# Patient Record
Sex: FEMALE | HISPANIC OR LATINO | Employment: FULL TIME | URBAN - METROPOLITAN AREA
[De-identification: names, ages, dates, MRNs, and addresses within clinical notes are randomized per-mention and may not be internally consistent; named-entity substitution may affect disease eponyms.]

---

## 2022-08-11 ENCOUNTER — OFFICE VISIT (OUTPATIENT)
Dept: URGENT CARE | Facility: CLINIC | Age: 52
End: 2022-08-11
Payer: OTHER MISCELLANEOUS

## 2022-08-11 DIAGNOSIS — S00.03XA HEMATOMA OF SCALP, INITIAL ENCOUNTER: ICD-10-CM

## 2022-08-11 DIAGNOSIS — Z02.6 ENCOUNTER RELATED TO WORKER'S COMPENSATION CLAIM: ICD-10-CM

## 2022-08-11 DIAGNOSIS — S46.811A STRAIN OF RIGHT TRAPEZIUS MUSCLE, INITIAL ENCOUNTER: ICD-10-CM

## 2022-08-11 DIAGNOSIS — S99.921A INJURY OF RIGHT FOOT, INITIAL ENCOUNTER: ICD-10-CM

## 2022-08-11 DIAGNOSIS — R51.9 ACUTE NONINTRACTABLE HEADACHE, UNSPECIFIED HEADACHE TYPE: ICD-10-CM

## 2022-08-11 DIAGNOSIS — S92.354A CLOSED NONDISPLACED FRACTURE OF FIFTH METATARSAL BONE OF RIGHT FOOT, INITIAL ENCOUNTER: Primary | ICD-10-CM

## 2022-08-11 PROCEDURE — 73630 X-RAY EXAM OF FOOT: CPT | Mod: RT,S$GLB,, | Performed by: RADIOLOGY

## 2022-08-11 PROCEDURE — 99204 PR OFFICE/OUTPT VISIT, NEW, LEVL IV, 45-59 MIN: ICD-10-PCS | Mod: S$GLB,,, | Performed by: NURSE PRACTITIONER

## 2022-08-11 PROCEDURE — 73630 XR FOOT COMPLETE 3 VIEW RIGHT: ICD-10-PCS | Mod: RT,S$GLB,, | Performed by: RADIOLOGY

## 2022-08-11 PROCEDURE — 99204 OFFICE O/P NEW MOD 45 MIN: CPT | Mod: S$GLB,,, | Performed by: NURSE PRACTITIONER

## 2022-08-11 RX ORDER — HYDROCODONE BITARTRATE AND ACETAMINOPHEN 5; 325 MG/1; MG/1
1 TABLET ORAL EVERY 6 HOURS PRN
Qty: 20 TABLET | Refills: 0 | Status: SHIPPED | OUTPATIENT
Start: 2022-08-11

## 2022-08-11 RX ORDER — IBUPROFEN 800 MG/1
800 TABLET ORAL 3 TIMES DAILY
Qty: 30 TABLET | Refills: 0 | Status: SHIPPED | OUTPATIENT
Start: 2022-08-11 | End: 2022-08-25 | Stop reason: SDUPTHER

## 2022-08-11 NOTE — PROGRESS NOTES
Subjective:       Patient ID: Blanca Thomas is a 52 y.o. female.    Chief Complaint: No chief complaint on file.    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 She hit the right side of her head and injured her right foot. Pain level 10    In wheelchair, German speaking female with  with c/o right foot pain and swelling and right side of head pain. 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 She states right arm is also sore. She states right after incident she felt dizziness but that has resolved still with tenderness to right side of head where there is a bump pain 8/10. Right foot with swelling and pain 10/10.     Leg Pain   The incident occurred 1 to 3 hours ago. The incident occurred at work. The injury mechanism was a fall. The pain is present in the right foot. The quality of the pain is described as shooting. The pain is at a severity of 10/10. The pain is severe. The pain has been worsening since onset. Associated symptoms include an inability to bear weight and a loss of motion. It is unknown if a foreign body is present. The symptoms are aggravated by weight bearing and movement. She has tried ice for the symptoms. The treatment provided no relief.       Constitution: Negative.   HENT: Negative.    Cardiovascular: Negative.    Respiratory: Negative.    Gastrointestinal: Negative.    Endocrine: negative.   Genitourinary: Negative.  Negative for frequency and urgency.   Musculoskeletal: Positive for pain, joint pain and joint swelling.   Skin: Negative.    Allergic/Immunologic: Negative.    Neurological: Positive for dizziness and headaches.   Hematologic/Lymphatic: Negative.    Psychiatric/Behavioral: Negative.         Objective:      Physical Exam  Vitals and nursing note reviewed.   Constitutional:        General: She is not in acute distress.     Appearance: She is well-developed. She is not diaphoretic.   HENT:      Head: Normocephalic and atraumatic.        Right Ear: Hearing and external ear normal.      Left Ear: Hearing and external ear normal.      Nose: Nose normal. No nasal deformity.   Eyes:      General: Lids are normal. No scleral icterus.     Conjunctiva/sclera: Conjunctivae normal.   Neck:      Trachea: Trachea normal.   Cardiovascular:      Pulses: Normal pulses.   Pulmonary:      Effort: Pulmonary effort is normal. No respiratory distress.      Breath sounds: No stridor.   Musculoskeletal:      Cervical back: Normal range of motion. Spasms and tenderness present.        Back:       Right foot: Swelling and tenderness present.        Legs:    Skin:     General: Skin is warm and dry.      Capillary Refill: Capillary refill takes less than 2 seconds.   Neurological:      Mental Status: She is alert. She is not disoriented.      GCS: GCS eye subscore is 4. GCS verbal subscore is 5. GCS motor subscore is 6.      Cranial Nerves: Cranial nerves 2-12 are intact.      Sensory: Sensation is intact. No sensory deficit.      Motor: Motor function is intact.      Coordination: Coordination is intact.      Gait: Gait abnormal (unable to test due to injury to foot).      Deep Tendon Reflexes: Reflexes are normal and symmetric.      Reflex Scores:       Tricep reflexes are 2+ on the right side and 2+ on the left side.       Bicep reflexes are 2+ on the right side and 2+ on the left side.       Brachioradialis reflexes are 2+ on the right side and 2+ on the left side.       Patellar reflexes are 2+ on the right side and 2+ on the left side.       Achilles reflexes are 2+ on the right side and 2+ on the left side.  Psychiatric:         Attention and Perception: She is attentive.         Speech: Speech normal.         Behavior: Behavior normal.       X-Ray Foot Complete 3 view Right    Result Date:  8/11/2022  EXAMINATION: XR FOOT COMPLETE 3 VIEW RIGHT CLINICAL HISTORY: . Unspecified injury of right foot, initial encounter TECHNIQUE: AP, lateral, and oblique views of the right foot were performed. COMPARISON: None FINDINGS: Bones appear appropriately mineralized for age.  Slight hallux valgus configuration.  Lisfranc articulation is congruent.  There is acute appearing fracture through the base of the 5th metatarsal approximate 1.5 cm distal to the styloid process, with fracture planes extending to the articular surface at the 5th tarsal metatarsal joint consistent with pseudo-Velez type fracture.  No dislocation or destructive osseous process.  Mild degenerative change at the 1st MTP joint.  No subcutaneous emphysema or radiodense retained foreign body.     Fifth metatarsal base acute appearing fracture with intra-articular extension, as above. Electronically signed by: Darinel Hairston MD Date:    08/11/2022 Time:    12:49    Assessment:       1. Closed nondisplaced fracture of fifth metatarsal bone of right foot, initial encounter    2. Injury of right foot, initial encounter    3. Hematoma of scalp, initial encounter    4. Encounter related to worker's compensation claim    5. Acute nonintractable headache, unspecified headache type    6. Strain of right trapezius muscle, initial encounter        Plan:     rx database reviewed  1. Monitor symptoms of headache- head trauma precautions- does not meet criteria for ct scan   2. norco for severe pain- fall precautions- ibuprofen for mild to moderate pain (hold prior to scheduled dental procedure  3. Refer to orthopedics for foot injury  4. Follow up here for head injury and neck strain- ibuprofen , ice ibuprofen  5. Crutches/walking boot. Non weight bearing  Medications Ordered This Encounter   Medications    HYDROcodone-acetaminophen (NORCO) 5-325 mg per tablet     Sig: Take 1 tablet by mouth every 6 (six) hours as needed for Pain (Take off duty only.).      Dispense:  20 tablet     Refill:  0     Quantity prescribed more than 7 day supply? Yes, quantity medically necessary     Order Specific Question:   I have reviewed the Prescription Drug Monitoring Program (PDMP) database for this patient prior to prescribing the above opioid medication     Answer:   Yes    ibuprofen (ADVIL,MOTRIN) 800 MG tablet     Sig: Take 1 tablet (800 mg total) by mouth 3 (three) times daily. Take with meals.     Dispense:  30 tablet     Refill:  0     Patient Instructions: Attention not to aggravate affected area, Apply ice 24-48 hours then apply heat/warm soaks, Elevated affected area, Referral to specialist to be scheduled, once authorized, Use Crutches as directed, Use splint as directed   Restrictions: Disabled until next office visit  Follow up in about 1 week (around 8/18/2022).

## 2022-08-11 NOTE — LETTER
Urgent Care - [address] [address]  Phone: [phone]  Fax: [phone]  [facility]: [id number]    Pt Name: Blanca Thomas  Injury Date: 08/11/2022   Employee ID: [id number] Date of First Treatment: 08/11/2022   Company:[employer]      Appointment Time: 11:00 AM Arrived:1100am   Provider: DOMINGO Davidson Time Out:130pm     Office Treatment:   1. Closed nondisplaced fracture of fifth metatarsal bone of right foot, initial encounter    2. Injury of right foot, initial encounter    3. Hematoma of scalp, initial encounter    4. Encounter related to worker's compensation claim    5. Acute nonintractable headache, unspecified headache type    6. Strain of right trapezius muscle, initial encounter      Medications Ordered This Encounter   Medications    HYDROcodone-acetaminophen (NORCO) 5-325 mg per tablet    ibuprofen (ADVIL,MOTRIN) 800 MG tablet      Patient Instructions: Attention not to aggravate affected area, Apply ice 24-48 hours then apply heat/warm soaks, Elevated affected area, Referral to specialist to be scheduled, once authorized, Use Crutches as directed, Use splint as directed    Restrictions: Disabled until next office visit     Return Appointment: 8/18/2022 1000am

## 2022-08-18 ENCOUNTER — OFFICE VISIT (OUTPATIENT)
Dept: URGENT CARE | Facility: CLINIC | Age: 52
End: 2022-08-18
Payer: OTHER MISCELLANEOUS

## 2022-08-18 DIAGNOSIS — S92.354D CLOSED NONDISPLACED FRACTURE OF FIFTH METATARSAL BONE OF RIGHT FOOT WITH ROUTINE HEALING, SUBSEQUENT ENCOUNTER: ICD-10-CM

## 2022-08-18 DIAGNOSIS — S99.921D INJURY OF RIGHT FOOT, SUBSEQUENT ENCOUNTER: Primary | ICD-10-CM

## 2022-08-18 PROCEDURE — 99214 PR OFFICE/OUTPT VISIT, EST, LEVL IV, 30-39 MIN: ICD-10-PCS | Mod: S$GLB,,, | Performed by: NURSE PRACTITIONER

## 2022-08-18 PROCEDURE — 99214 OFFICE O/P EST MOD 30 MIN: CPT | Mod: S$GLB,,, | Performed by: NURSE PRACTITIONER

## 2022-08-18 NOTE — PROGRESS NOTES
Subjective:       Patient ID: Blanca Thomas is a 52 y.o. female.    Chief Complaint: Foot Injury (Date of Injury 8/11/22.  RIGHT FOOT)    Interpretor # [id number] used via [employer] ([employer] [place]) Pt is here for a follow up on a RIGHT FOOT injury from 8/11/22.  She is a  for [employer].  She reports a 8/10 pain today.  CT       Patient here for follow up visit right foot fracture.  used for visit. Patient states pain in foot is 8/10 when putting foot down or moving. She is non weight bearing. She is using crutches. She does not need pain medicine refill. She has some swelling and bruising.  She has podiatry appt on 9/7/2022        Foot Injury   The incident occurred more than 1 week ago. The incident occurred at work. The injury mechanism was a fall. The pain is present in the right foot. The quality of the pain is described as aching. The pain is at a severity of 8/10. The pain is severe. Associated symptoms include an inability to bear weight. She reports no foreign bodies present. The symptoms are aggravated by movement, palpation and weight bearing. She has tried ice, immobilization, elevation, NSAIDs, non-weight bearing, rest and heat for the symptoms. The treatment provided no relief.       Constitution: Negative.   HENT: Negative.    Cardiovascular: Negative.    Respiratory: Negative.    Gastrointestinal: Negative.    Endocrine: negative.   Genitourinary: Negative.  Negative for frequency and urgency.   Musculoskeletal: Negative.  Positive for pain.   Skin: Negative.    Allergic/Immunologic: Negative.    Neurological: Negative.    Hematologic/Lymphatic: Negative.    Psychiatric/Behavioral: Negative.         Objective:      Physical Exam  Vitals and nursing note reviewed.   Constitutional:       General: She is not in acute distress.     Appearance: She is well-developed. She is not diaphoretic.   HENT:      Head: Normocephalic and atraumatic.      Right Ear: Hearing and  external ear normal.      Left Ear: Hearing and external ear normal.      Nose: Nose normal. No nasal deformity.   Eyes:      General: Lids are normal. No scleral icterus.     Conjunctiva/sclera: Conjunctivae normal.   Neck:      Trachea: Trachea normal.   Cardiovascular:      Pulses: Normal pulses.   Pulmonary:      Effort: Pulmonary effort is normal. No respiratory distress.      Breath sounds: No stridor.   Musculoskeletal:      Cervical back: Normal range of motion.      Right foot: Swelling and tenderness present.        Legs:    Skin:     General: Skin is warm and dry.      Capillary Refill: Capillary refill takes less than 2 seconds.   Neurological:      Mental Status: She is alert. She is not disoriented.      GCS: GCS eye subscore is 4. GCS verbal subscore is 5. GCS motor subscore is 6.      Sensory: No sensory deficit.   Psychiatric:         Attention and Perception: She is attentive.         Speech: Speech normal.         Behavior: Behavior normal.         Assessment:       1. Injury of right foot, subsequent encounter    2. Closed nondisplaced fracture of fifth metatarsal bone of right foot with routine healing, subsequent encounter        Plan:     will have patient follow up next week then podiatry 9/7/12022.   Ace wrap applied.   Continue rice  Non weight bearing       Patient Instructions: Use splint as directed, Use Crutches as directed, Attention not to aggravate affected area, Daily home exercises/warm soaks, Apply ice 24-48 hours then apply heat/warm soaks, Elevated affected area   Restrictions: Disabled until next office visit  Follow up in about 1 week (around 8/25/2022).

## 2022-08-18 NOTE — LETTER
Urgent Care - [address] [address]  Phone: [phone]  Fax: [phone]  [facility]: [id number]    Pt Name: Blanca Thomas  Injury Date: 08/11/2022   Employee ID: [id number] Date of Treatment: 08/18/2022   Company: [employer]      Appointment Time: 12:45 PM Arrived:1237   Provider: DOMINGO Davidson Time Out:1346     Office Treatment:   1. Injury of right foot, subsequent encounter    2. Closed nondisplaced fracture of fifth metatarsal bone of right foot with routine healing, subsequent encounter          Patient Instructions: Use splint as directed, Use Crutches as directed, Attention not to aggravate affected area, Daily home exercises/warm soaks, Apply ice 24-48 hours then apply heat/warm soaks, Elevated affected area    Restrictions: Disabled until next office visit     Return Appointment: Visit date 8/25/2022 at 900am

## 2022-08-25 ENCOUNTER — OFFICE VISIT (OUTPATIENT)
Dept: URGENT CARE | Facility: CLINIC | Age: 52
End: 2022-08-25
Payer: OTHER MISCELLANEOUS

## 2022-08-25 DIAGNOSIS — Z02.6 ENCOUNTER RELATED TO WORKER'S COMPENSATION CLAIM: Primary | ICD-10-CM

## 2022-08-25 DIAGNOSIS — S99.921A INJURY OF RIGHT FOOT, INITIAL ENCOUNTER: ICD-10-CM

## 2022-08-25 DIAGNOSIS — S92.354A CLOSED NONDISPLACED FRACTURE OF FIFTH METATARSAL BONE OF RIGHT FOOT, INITIAL ENCOUNTER: ICD-10-CM

## 2022-08-25 DIAGNOSIS — S00.03XA HEMATOMA OF SCALP, INITIAL ENCOUNTER: ICD-10-CM

## 2022-08-25 DIAGNOSIS — S92.354D CLOSED NONDISPLACED FRACTURE OF FIFTH METATARSAL BONE OF RIGHT FOOT WITH ROUTINE HEALING, SUBSEQUENT ENCOUNTER: ICD-10-CM

## 2022-08-25 PROCEDURE — 99213 PR OFFICE/OUTPT VISIT, EST, LEVL III, 20-29 MIN: ICD-10-PCS | Mod: S$GLB,,, | Performed by: NURSE PRACTITIONER

## 2022-08-25 PROCEDURE — 99213 OFFICE O/P EST LOW 20 MIN: CPT | Mod: S$GLB,,, | Performed by: NURSE PRACTITIONER

## 2022-08-25 RX ORDER — IBUPROFEN 800 MG/1
800 TABLET ORAL 3 TIMES DAILY
Qty: 30 TABLET | Refills: 0 | Status: SHIPPED | OUTPATIENT
Start: 2022-08-25

## 2022-08-25 NOTE — LETTER
Urgent Care - [address] [address]  Phone: [phone]  Fax: [phone]  [facility]: [id number]    Pt Name: Blanca Thomas  Injury Date: 08/11/2022   Employee ID:  Date of Treatment: 08/25/2022   Company: [employer]      Appointment Time: 08:45 AM Arrived: 0905 AM   Provider: DOMINGO Davidson Time Out: 1005 AM     Office Treatment:   1. Encounter related to worker's compensation claim    2. Closed nondisplaced fracture of fifth metatarsal bone of right foot with routine healing, subsequent encounter    3. Injury of right foot, initial encounter          Patient Instructions: Attention not to aggravate affected area, Use splint as directed, Apply ice 24-48 hours then apply heat/warm soaks, Elevated affected area (passive rom and massage)      Restrictions: Discharged to Ortho/Neuro/Opthomologist/Surgeon, Disabled until next office visit     Return Appointment: NONE  DISCHARGED TO SPECIALIST    CT

## 2022-08-25 NOTE — PROGRESS NOTES
Subjective:       Patient ID: Blanca Thomas is a 52 y.o. female.    Chief Complaint: Foot Injury (RIGHT FOOT 8/11/22)    Patient prefers daughter to translate in stead of Martii.  Pt is here for a follow up on a RIGHT FOOT injury from 8/11/22.  She is a [place] for [employer].  She reports a 10/10 pain today. She reports increased swelling.  CT     Foot Injury   The incident occurred more than 1 week ago. The incident occurred at work. The pain is present in the left foot. The quality of the pain is described as aching and cramping. The pain is at a severity of 10/10. The pain is moderate. The pain has been constant since onset. Associated symptoms include an inability to bear weight. She reports no foreign bodies present. The symptoms are aggravated by weight bearing, movement and palpation. She has tried ice, non-weight bearing, rest and elevation for the symptoms. The treatment provided mild relief.       Constitution: Negative.   HENT: Negative.    Cardiovascular: Negative.    Respiratory: Negative.    Gastrointestinal: Negative.    Endocrine: negative.   Genitourinary: Negative.  Negative for frequency and urgency.   Musculoskeletal: Negative.  Positive for pain.   Skin: Negative.    Allergic/Immunologic: Negative.    Neurological: Negative.    Hematologic/Lymphatic: Negative.    Psychiatric/Behavioral: Negative.         Objective:      Physical Exam  Vitals and nursing note reviewed.   Constitutional:       General: She is not in acute distress.     Appearance: She is well-developed. She is not diaphoretic.   HENT:      Head: Normocephalic and atraumatic.      Right Ear: Hearing and external ear normal.      Left Ear: Hearing and external ear normal.      Nose: Nose normal. No nasal deformity.   Eyes:      General: Lids are normal. No scleral icterus.     Conjunctiva/sclera: Conjunctivae normal.   Neck:      Trachea: Trachea normal.   Cardiovascular:      Pulses: Normal pulses.   Pulmonary:       Effort: Pulmonary effort is normal. No respiratory distress.      Breath sounds: No stridor.   Musculoskeletal:      Cervical back: Normal range of motion.      Right foot: Decreased range of motion. Swelling and tenderness present.        Legs:    Skin:     General: Skin is warm and dry.      Capillary Refill: Capillary refill takes less than 2 seconds.   Neurological:      Mental Status: She is alert. She is not disoriented.      GCS: GCS eye subscore is 4. GCS verbal subscore is 5. GCS motor subscore is 6.      Sensory: No sensory deficit.   Psychiatric:         Attention and Perception: She is attentive.         Speech: Speech normal.         Behavior: Behavior normal.         Assessment:       1. Encounter related to worker's compensation claim    2. Closed nondisplaced fracture of fifth metatarsal bone of right foot with routine healing, subsequent encounter    3. Injury of right foot, initial encounter        Plan:     patient has appt with podiatry next week.   Will refill rx for ibuprofen  Passive rom and massage recommended. Heat to foot to increase blood flow       Patient Instructions: Attention not to aggravate affected area, Use splint as directed, Apply ice 24-48 hours then apply heat/warm soaks, Elevated affected area (passive rom and massage)   Restrictions: Discharged to Ortho/Neuro/Opthomologist/Surgeon, Disabled until next office visit  Follow up for with specialist.

## 2022-09-07 ENCOUNTER — OFFICE VISIT (OUTPATIENT)
Dept: PODIATRY | Facility: CLINIC | Age: 52
End: 2022-09-07

## 2022-09-07 VITALS
HEART RATE: 80 BPM | SYSTOLIC BLOOD PRESSURE: 130 MMHG | DIASTOLIC BLOOD PRESSURE: 80 MMHG | WEIGHT: 190.06 LBS | HEIGHT: 65 IN | BODY MASS INDEX: 31.67 KG/M2

## 2022-09-07 DIAGNOSIS — M79.89 PAIN AND SWELLING OF TOE OF RIGHT FOOT: ICD-10-CM

## 2022-09-07 DIAGNOSIS — S92.351D FRACTURE OF BASE OF FIFTH METATARSAL BONE WITH ROUTINE HEALING, RIGHT: Primary | ICD-10-CM

## 2022-09-07 DIAGNOSIS — M79.674 PAIN AND SWELLING OF TOE OF RIGHT FOOT: ICD-10-CM

## 2022-09-07 PROCEDURE — 99203 OFFICE O/P NEW LOW 30 MIN: CPT | Mod: S$PBB,,, | Performed by: PODIATRIST

## 2022-09-07 PROCEDURE — 99999 PR PBB SHADOW E&M-EST. PATIENT-LVL III: ICD-10-PCS | Mod: PBBFAC,,, | Performed by: PODIATRIST

## 2022-09-07 PROCEDURE — 99203 PR OFFICE/OUTPT VISIT, NEW, LEVL III, 30-44 MIN: ICD-10-PCS | Mod: S$PBB,,, | Performed by: PODIATRIST

## 2022-09-07 PROCEDURE — 99999 PR PBB SHADOW E&M-EST. PATIENT-LVL III: CPT | Mod: PBBFAC,,, | Performed by: PODIATRIST

## 2022-09-07 PROCEDURE — 99213 OFFICE O/P EST LOW 20 MIN: CPT | Mod: PBBFAC | Performed by: PODIATRIST

## 2022-09-07 NOTE — LETTER
September 7, 2022      [id number]  [address]  [facility] [address]  Phone: [phone]       Patient: Blanca Thomas   YOB: 1970  Date of Visit: 09/07/2022    To Whom It May Concern:    Evon Thomas  was at [facility] on 09/07/2022. The patient is to remain off work until 10/10/22 due to a foot injury. If you have any questions or concerns, or if I can be of further assistance, please do not hesitate to contact me.    Sincerely,    Jon Shaikh DPM

## 2022-09-07 NOTE — PROGRESS NOTES
"Subjective:      Patient ID: Blanca Thomas is a 52 y.o. female.    Chief Complaint: Foot Injury (Right foot fracture. Injury happened 8/11/22. Worker's Comp//XRAY done: 8/11/2022)     is a 52 y.o. female who presents to the podiatry clinic  with complaint of  right foot pain. Onset of the symptoms was about a month ago. 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 Current symptoms include:  pain and swelling R foot . Aggravating factors: any weight bearing. Symptoms have progressed to a point and plateaued. Patient has had no prior foot problems. Evaluation to date: plain films: abnormal fracture base of R 5th metatrsal, non displaced, non angulated/nondeviated . Treatment to date:  went to ER where they obtained xrays, provided a short CAM boot and crutches. States she has been off the foot for the past 3-4 weeks . Patients rates pain 6/10 on pain scale.    Vitals:    09/07/22 1001   BP: 130/80   Pulse: 80   Weight: 86.2 kg (190 lb 0.6 oz)   Height: 5' 5" (1.651 m)     Review of Systems   Constitutional: Negative for chills and fever.   Cardiovascular:  Negative for chest pain, claudication and leg swelling.   Respiratory:  Negative for cough and shortness of breath.    Skin:  Negative for dry skin and nail changes.   Musculoskeletal:         R foot swelling and pain   Gastrointestinal:  Negative for nausea and vomiting.   Neurological:  Negative for numbness and paresthesias.   Psychiatric/Behavioral:  Negative for altered mental status.          Objective:      Physical Exam  Vitals reviewed.   Constitutional:       Appearance: She is well-developed.   HENT:      Head: Normocephalic.   Cardiovascular:      Pulses:           Dorsalis pedis pulses are 2+ on the right side and 2+ on the left side.        Posterior tibial pulses are 2+ on the right side and 2+ on the left side.      Comments: CRT < 3 sec to tips of toes. "   Pulmonary:      Effort: No respiratory distress.   Musculoskeletal:      Right lower leg: Edema (foot) present.      Left lower leg: No edema.      Comments: Pain with palpation of metatarsals 4/5 R foot, mostly 5th metatarsal base. Pain on ROM of 5th ray R foot. No palpable bony hypertrophy along R lateral foot. Otherwise rectus foot and toe position bilateral with no major deformities noted. Mild equinus noted b/l ankles with < 10 deg DF noted. MMT 5/5 in DF/PF/Inv/Ev resistance with no reproduction of pain in any direction. Passive range of motion of ankle and pedal joints is painless b/l.     Skin:     General: Skin is warm and dry.      Findings: No erythema.      Comments: No open lesions, lacerations or wounds noted. Nails are normal to R 1-5 and L 1-5. Interdigital spaces clean, dry and intact b/l. No erythema noted to b/l foot. Skin texture normal. Pedal hair normal. No ecchymosis or bruising noted to R foot. Skin temperature normal b/l foot.      Neurological:      Mental Status: She is alert and oriented to person, place, and time.      Sensory: No sensory deficit.      Comments: Light touch, proprioception, and sharp/dull sensation are all intact bilaterally.    Psychiatric:         Behavior: Behavior normal.         Thought Content: Thought content normal.         Judgment: Judgment normal.             Assessment:       Encounter Diagnoses   Name Primary?    Fracture of base of fifth metatarsal bone with routine healing, right Yes    Pain and swelling of toe of right foot          Plan:        was seen today for foot injury.    Diagnoses and all orders for this visit:    Fracture of base of fifth metatarsal bone with routine healing, right  -     X-Ray Foot Complete Right; Future  -     WALKER FOR HOME USE    Pain and swelling of toe of right foot  -     X-Ray Foot Complete Right; Future  -     WALKER FOR HOME USE      I counseled the patient on her conditions, their implications and medical  management.    Previous xray interpreted personally by myself. New Xray ordered right foot to assess for healing of fracture.     OTC NSAIDs as needed/directed for pain relief.     Continue CAM boot with complete NWB to R foot with crutches. Rolling knee walker ordered for better ambulation.     No further intervention at this time. Monthly xrays to assess healing. Briefly advised patient that if proper healing does not occur in the next 1 months, we may consider surgery to repair and fixate fracture. She verbalized understanding.     Work noted provided to remain off work for 1 month, until further notice.     RTC 1 month, sooner PRN

## 2022-09-30 ENCOUNTER — HOSPITAL ENCOUNTER (OUTPATIENT)
Dept: RADIOLOGY | Facility: HOSPITAL | Age: 52
Discharge: HOME | End: 2022-09-30
Attending: PODIATRIST

## 2022-09-30 DIAGNOSIS — M79.89 PAIN AND SWELLING OF TOE OF RIGHT FOOT: ICD-10-CM

## 2022-09-30 DIAGNOSIS — S92.351D FRACTURE OF BASE OF FIFTH METATARSAL BONE WITH ROUTINE HEALING, RIGHT: ICD-10-CM

## 2022-09-30 DIAGNOSIS — M79.674 PAIN AND SWELLING OF TOE OF RIGHT FOOT: ICD-10-CM

## 2022-09-30 PROCEDURE — 73630 XR FOOT COMPLETE 3 VIEW RIGHT: ICD-10-PCS | Mod: 26,RT,, | Performed by: RADIOLOGY

## 2022-09-30 PROCEDURE — 73630 X-RAY EXAM OF FOOT: CPT | Mod: TC,RT

## 2022-09-30 PROCEDURE — 73630 X-RAY EXAM OF FOOT: CPT | Mod: 26,RT,, | Performed by: RADIOLOGY

## 2022-10-03 ENCOUNTER — TELEPHONE (OUTPATIENT)
Dept: PODIATRY | Facility: CLINIC | Age: 52
End: 2022-10-03
Payer: COMMERCIAL

## 2022-10-03 NOTE — TELEPHONE ENCOUNTER
----- Message from Jon Shaikh DPM sent at 10/3/2022  1:06 PM CDT -----  Please call her and let her know that the xray looks stable. Some signs of healing present. Will still take another 1-2 months to fully heal. Continue with current treatment plan and follow up as scheduled. Thanks.

## 2022-10-07 ENCOUNTER — OFFICE VISIT (OUTPATIENT)
Dept: PODIATRY | Facility: CLINIC | Age: 52
End: 2022-10-07
Payer: OTHER MISCELLANEOUS

## 2022-10-07 VITALS
HEIGHT: 65 IN | DIASTOLIC BLOOD PRESSURE: 82 MMHG | WEIGHT: 190.06 LBS | SYSTOLIC BLOOD PRESSURE: 133 MMHG | BODY MASS INDEX: 31.67 KG/M2 | HEART RATE: 82 BPM

## 2022-10-07 DIAGNOSIS — M79.671 FOOT PAIN, RIGHT: ICD-10-CM

## 2022-10-07 DIAGNOSIS — S92.351D FRACTURE OF BASE OF FIFTH METATARSAL BONE WITH ROUTINE HEALING, RIGHT: Primary | ICD-10-CM

## 2022-10-07 PROCEDURE — 99999 PR PBB SHADOW E&M-EST. PATIENT-LVL III: CPT | Mod: PBBFAC,,, | Performed by: PODIATRIST

## 2022-10-07 PROCEDURE — 99213 OFFICE O/P EST LOW 20 MIN: CPT | Mod: S$GLB,,, | Performed by: PODIATRIST

## 2022-10-07 PROCEDURE — 99213 PR OFFICE/OUTPT VISIT, EST, LEVL III, 20-29 MIN: ICD-10-PCS | Mod: S$GLB,,, | Performed by: PODIATRIST

## 2022-10-07 PROCEDURE — 99999 PR PBB SHADOW E&M-EST. PATIENT-LVL III: ICD-10-PCS | Mod: PBBFAC,,, | Performed by: PODIATRIST

## 2022-10-07 RX ORDER — GLIPIZIDE 10 MG/1
TABLET ORAL
COMMUNITY
End: 2022-10-27 | Stop reason: SDUPTHER

## 2022-10-07 RX ORDER — ATORVASTATIN CALCIUM 20 MG/1
TABLET, FILM COATED ORAL
COMMUNITY
End: 2022-10-27 | Stop reason: SDUPTHER

## 2022-10-07 RX ORDER — ATORVASTATIN CALCIUM 20 MG/1
20 TABLET, FILM COATED ORAL DAILY
COMMUNITY
Start: 2022-08-03

## 2022-10-07 RX ORDER — METHOCARBAMOL 500 MG/1
TABLET, FILM COATED ORAL
COMMUNITY
Start: 2022-08-11

## 2022-10-07 RX ORDER — CICLOPIROX 80 MG/ML
SOLUTION TOPICAL
COMMUNITY
Start: 2022-06-18

## 2022-10-07 RX ORDER — CICLOPIROX 80 MG/ML
SOLUTION TOPICAL
COMMUNITY

## 2022-10-07 RX ORDER — METHOCARBAMOL 500 MG/1
TABLET, FILM COATED ORAL
COMMUNITY

## 2022-10-07 RX ORDER — GLIPIZIDE 10 MG/1
TABLET ORAL
COMMUNITY
Start: 2022-07-29

## 2022-10-07 RX ORDER — INSULIN DETEMIR 100 [IU]/ML
INJECTION, SOLUTION SUBCUTANEOUS
COMMUNITY
Start: 2022-06-29

## 2022-10-07 RX ORDER — PEN NEEDLE, DIABETIC 30 GX3/16"
NEEDLE, DISPOSABLE MISCELLANEOUS
COMMUNITY

## 2022-10-07 RX ORDER — EMPAGLIFLOZIN 25 MG/1
TABLET, FILM COATED ORAL
COMMUNITY
Start: 2022-07-29

## 2022-10-07 RX ORDER — DICLOFENAC SODIUM 50 MG/1
TABLET, DELAYED RELEASE ORAL
COMMUNITY

## 2022-10-07 RX ORDER — DICLOFENAC SODIUM 50 MG/1
TABLET, DELAYED RELEASE ORAL
COMMUNITY
Start: 2022-07-29

## 2022-10-07 RX ORDER — LANCETS 28 GAUGE
EACH MISCELLANEOUS
COMMUNITY
Start: 2022-05-04

## 2022-10-07 NOTE — PROGRESS NOTES
"Subjective:      Patient ID: Blanca Thomas is a 52 y.o. female.    Chief Complaint: Foot Injury (R foot fracture. XRAY done on 9/30/22)     is a 52 y.o. female who presents to the podiatry clinic  with complaint of  right foot pain. Onset of the symptoms was about a month ago. 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 Current symptoms include:  pain and swelling R foot . Aggravating factors: any weight bearing. Symptoms have progressed to a point and plateaued. Patient has had no prior foot problems. Evaluation to date: plain films: abnormal fracture base of R 5th metatrsal, non displaced, non angulated/nondeviated . Treatment to date:  went to ER where they obtained xrays, provided a short CAM boot and crutches. States she has been off the foot for the past 3-4 weeks . Patients rates pain 6/10 on pain scale.    10/07/2022: follow up for R foot 5th met base fracture. Has been keeping off foot with wheelchair and crutches. No pain. Had xray done which showed healing fracture of 5th met base.  No other complaints at this time.       Vitals:    10/07/22 1020   BP: 133/82   Pulse: 82   Weight: 86.2 kg (190 lb 0.6 oz)   Height: 5' 5" (1.651 m)   PainSc: 0-No pain     Review of Systems   Constitutional: Negative for chills and fever.   Cardiovascular:  Negative for chest pain, claudication and leg swelling.   Respiratory:  Negative for cough and shortness of breath.    Skin:  Negative for dry skin and nail changes.   Musculoskeletal:         R foot swelling and pain, improved   Gastrointestinal:  Negative for nausea and vomiting.   Neurological:  Negative for numbness and paresthesias.   Psychiatric/Behavioral:  Negative for altered mental status.          Objective:      Physical Exam  Vitals reviewed.   Constitutional:       Appearance: She is well-developed.   HENT:      Head: Normocephalic.   Cardiovascular:      Pulses:        "    Dorsalis pedis pulses are 2+ on the right side and 2+ on the left side.        Posterior tibial pulses are 2+ on the right side and 2+ on the left side.      Comments: CRT < 3 sec to tips of toes.   Pulmonary:      Effort: No respiratory distress.   Musculoskeletal:      Right lower leg: Edema (foot, improved) present.      Left lower leg: No edema.      Comments: Minimal to no pain with palpation and ROM of 5th metatarsal R foot. Pain on ROM of 5th ray R foot. No palpable bony hypertrophy along R lateral foot. Otherwise rectus foot and toe position bilateral with no major deformities noted. Mild equinus noted b/l ankles with < 10 deg DF noted. MMT 5/5 in DF/PF/Inv/Ev resistance with no reproduction of pain in any direction. Passive range of motion of ankle and pedal joints is painless b/l.     Skin:     General: Skin is warm and dry.      Findings: No erythema.      Comments: No open lesions, lacerations or wounds noted. Nails are normal to R 1-5 and L 1-5. Interdigital spaces clean, dry and intact b/l. No erythema noted to b/l foot. Skin texture normal. Pedal hair normal. No ecchymosis or bruising noted to R foot. Skin temperature normal b/l foot.      Neurological:      Mental Status: She is alert and oriented to person, place, and time.      Sensory: No sensory deficit.      Comments: Light touch, proprioception, and sharp/dull sensation are all intact bilaterally.    Psychiatric:         Behavior: Behavior normal.         Thought Content: Thought content normal.         Judgment: Judgment normal.             Assessment:       Encounter Diagnoses   Name Primary?    Fracture of base of fifth metatarsal bone with routine healing, right Yes    Foot pain, right          Plan:        was seen today for foot injury.    Diagnoses and all orders for this visit:    Fracture of base of fifth metatarsal bone with routine healing, right    Foot pain, right    I counseled the patient on her conditions, their  implications and medical management.    Xray reviewed noting healing fracture of 5th met base. Stable.     OTC NSAIDs as needed/directed for pain relief.     Continue CAM boot with transition to partial WB with crutches for 2-3 weeks followed by full WB in CAM boot with no crutches.     Work noted provided to remain off work for additional 1 month, until further notice.     Xray prior to next appointment.     RTC 3 weeks, sooner PRN

## 2022-10-07 NOTE — LETTER
October 7, 2022      [id number]  [address]  [facility] [address]  Phone: [phone]       Patient: Blanca Thomas   YOB: 1970  Date of Visit: 10/07/2022    To Whom It May Concern:    Evon Thomas  was at [facility] on 10/07/2022. At this time she is to remain off work until 11/7/22. If you have any questions or concerns, or if I can be of further assistance, please do not hesitate to contact me.    Sincerely,    Jon Shaikh DPM

## 2022-10-27 ENCOUNTER — TELEPHONE (OUTPATIENT)
Dept: PODIATRY | Facility: CLINIC | Age: 52
End: 2022-10-27
Payer: COMMERCIAL

## 2022-10-27 ENCOUNTER — OFFICE VISIT (OUTPATIENT)
Dept: PODIATRY | Facility: CLINIC | Age: 52
End: 2022-10-27

## 2022-10-27 ENCOUNTER — HOSPITAL ENCOUNTER (OUTPATIENT)
Dept: RADIOLOGY | Facility: HOSPITAL | Age: 52
Discharge: HOME | End: 2022-10-27
Attending: PODIATRIST

## 2022-10-27 VITALS
HEART RATE: 76 BPM | BODY MASS INDEX: 31.65 KG/M2 | DIASTOLIC BLOOD PRESSURE: 78 MMHG | HEIGHT: 65 IN | WEIGHT: 190 LBS | SYSTOLIC BLOOD PRESSURE: 127 MMHG | RESPIRATION RATE: 18 BRPM

## 2022-10-27 DIAGNOSIS — S92.351D FRACTURE OF BASE OF FIFTH METATARSAL BONE WITH ROUTINE HEALING, RIGHT: ICD-10-CM

## 2022-10-27 DIAGNOSIS — S92.351D FRACTURE OF BASE OF FIFTH METATARSAL BONE WITH ROUTINE HEALING, RIGHT: Primary | ICD-10-CM

## 2022-10-27 PROCEDURE — 99213 PR OFFICE/OUTPT VISIT, EST, LEVL III, 20-29 MIN: ICD-10-PCS | Mod: S$PBB,,, | Performed by: PODIATRIST

## 2022-10-27 PROCEDURE — 99213 OFFICE O/P EST LOW 20 MIN: CPT | Mod: S$PBB,,, | Performed by: PODIATRIST

## 2022-10-27 PROCEDURE — 73630 XR FOOT COMPLETE 3 VIEW RIGHT: ICD-10-PCS | Mod: 26,RT,, | Performed by: RADIOLOGY

## 2022-10-27 PROCEDURE — 99999 PR PBB SHADOW E&M-EST. PATIENT-LVL IV: ICD-10-PCS | Mod: PBBFAC,,, | Performed by: PODIATRIST

## 2022-10-27 PROCEDURE — 73630 X-RAY EXAM OF FOOT: CPT | Mod: TC,RT

## 2022-10-27 PROCEDURE — 99999 PR PBB SHADOW E&M-EST. PATIENT-LVL IV: CPT | Mod: PBBFAC,,, | Performed by: PODIATRIST

## 2022-10-27 PROCEDURE — 73630 X-RAY EXAM OF FOOT: CPT | Mod: 26,RT,, | Performed by: RADIOLOGY

## 2022-10-27 PROCEDURE — 99214 OFFICE O/P EST MOD 30 MIN: CPT | Mod: PBBFAC | Performed by: PODIATRIST

## 2022-10-27 NOTE — TELEPHONE ENCOUNTER
----- Message from Tanna Agudelo sent at 10/27/2022 10:15 AM CDT -----  Regarding: Orders  Contact: Daughter [phone]  Caller ( Daughter ) is requesting a Call back regarding  scheduling Xray orders in system   Caller states they are there now .Please Call to discuss further     Attempt to schedule   Not allowed

## 2022-10-27 NOTE — TELEPHONE ENCOUNTER
Laboratory Medicine and Pathology  Transfusion Medicine - Apheresis Procedure Note    Steph Agee MRN# [id number]   YOB: 1974 Age: 42 year old   Date of Procedure: 2/19/2017    Procedure:  Autologous PBSC Collection  Reason for Procedure:Hodgkin's Disease                      Assessment and Plan:   Steph Agee is a 42 year old female with history of Hodgkin's disease undergoing collection for autologous PBSC transplant. She is on our schedule for tomorrow     Attestation:   This patient has been seen and evaluated by me, Quirino Gaming.              History of Present Illness   Steph Agee is a 42 year old female with history of Hodgkin's disease referred to the Pulmonary BMT clinic by Dr. Chambers for evaluation of an abnormal chest CT.  Diagnosed with Hodgkin's disease in 11-14 and treated with 6 cycles of ABVD. Tolerated chemotherapy well without any difficulties from a pulmonary perspective.  Also underwent XRT to left shoulder, axilla and she thinks chest.  Had recurrence of disease in 5-16 in sternum and manubrium.  Underwent treatment with ICE x 4; no pulmonary complications.  Was being evaluated for autologous transplant when re-developed B symptoms.  Then treated with 3 cycles of Brituximab.  Had left sided thoracentesis for pleural effusion.  Developed flu like illness in January 2017 with cough and purulent sputum production, saw Oncologist, treated with Levofloxacin for 10 days with complete resolution of symptoms.  Recent PET-CT with RUL nodular infiltrate, per my reading this is improved compared to her study in January.  Episode of pneumonia 20 years ago.  Now undergoing collection for autologous PBSC transplant          Past Medical History:     Past Medical History   Diagnosis Date     Depression with anxiety      History of blood transfusion      Hodgkin disease (H) 2014, 2016     Neuropathy (H)      SVC syndrome 2014             Past Surgical History:     Past  Spoke with daughter,  they are currently at the Imaging center, Xray in.  Spoke with Luna at Imaging center who says they will get xray done.   "Surgical History   Procedure Laterality Date     Biopsy/excision lymph node(s), needle, superficial (cervical/inguinal/axillary) Left 2014     axillary     Biopsy of mass in the manubrium Left 2016     Insert port vascular access Right 2/14/2017     Procedure: INSERT PORT VASCULAR ACCESS;  Surgeon: Michael Sy PA-C;  Location:  OR              Social History:     Social History   Substance Use Topics     Smoking status: Former Smoker     Packs/day: 1.00     Types: Cigarettes     Start date: 1/1/1995     Quit date: 11/20/2015     Smokeless tobacco: Never Used     Alcohol use 0.0 oz/week     0 Standard drinks or equivalent per week      Comment: occasionally             Allergies:     Allergies   Allergen Reactions     Lisinopril Cough             Medications:     Current Outpatient Prescriptions   Medication Sig Dispense Refill     losartan (COZAAR) 25 MG tablet Take 0.5 tablets (12.5 mg) by mouth daily 45 tablet 0     venlafaxine (EFFEXOR-XR) 75 MG 24 hr capsule Take 1 capsule (75 mg) by mouth daily 30 capsule 3     gabapentin (NEURONTIN) 100 MG capsule Take 2 capsules (200 mg) by mouth 3 times daily 90 capsule 3     LORAZEPAM PO Take 0.25 mg by mouth every 4 hours as needed for anxiety              Abbreviated Physical Exam:   /60  Pulse 96  Temp 98.4  F (36.9  C) (Oral)  Resp 18  Ht 1.626 m (5' 4.02\")  Wt 81.2 kg (179 lb 0.2 oz)  BMI 30.71 kg/m2  Patient Alert & Oriented and in No Acute Distress         Laboratory Data:     BMP    Recent Labs  Lab 02/19/17  0810 02/18/17  0821 02/16/17  0819 02/15/17  0845    143 141 142   POTASSIUM 3.4 3.6 3.9 4.1   CHLORIDE 106 106 107 108   VALERIA 8.0* 7.8* 8.7 8.2*   CO2 32 28 27 25   BUN 10 14 15 16   CR 0.61 0.61 0.61 0.66   GLC 93 82 92 91     CBC    Recent Labs  Lab 02/19/17  0810 02/18/17  0821 02/17/17  0822 02/16/17  0819   WBC 38.6* 45.8* 45.6* 40.8*   RBC 2.83* 2.83* 3.13* 3.28*   HGB 9.5* 9.6* 10.5* 10.8*   HCT 28.4* 28.8* 31.5* 32.7*   MCV " 100 102* 101* 100   MCH 33.6* 33.9* 33.5* 32.9   MCHC 33.5 33.3 33.3 33.0   RDW 17.6* 17.6* 17.4* 17.2*   PLT 81* 106* 132* 156     INR    Recent Labs  Lab 02/14/17  1157   INR 0.94     Results for orders placed or performed during the hospital encounter of 02/19/17 (from the past 24 hour(s))   CBC with platelets differential   Result Value Ref Range    WBC 38.6 (H) 4.0 - 11.0 10e9/L    RBC Count 2.83 (L) 3.8 - 5.2 10e12/L    Hemoglobin 9.5 (L) 11.7 - 15.7 g/dL    Hematocrit 28.4 (L) 35.0 - 47.0 %     78 - 100 fl    MCH 33.6 (H) 26.5 - 33.0 pg    MCHC 33.5 31.5 - 36.5 g/dL    RDW 17.6 (H) 10.0 - 15.0 %    Platelet Count 81 (L) 150 - 450 10e9/L    Diff Method Manual Differential     % Neutrophils 91.3 %    % Lymphocytes 2.6 %    % Monocytes 1.7 %    % Eosinophils 2.6 %    % Basophils 0.9 %    % Metamyelocytes 0.9 %    Absolute Neutrophil 35.2 (H) 1.6 - 8.3 10e9/L    Absolute Lymphocytes 1.0 0.8 - 5.3 10e9/L    Absolute Monocytes 0.7 0.0 - 1.3 10e9/L    Absolute Eosinophils 1.0 (H) 0.0 - 0.7 10e9/L    Absolute Basophils 0.3 (H) 0.0 - 0.2 10e9/L    Absolute Metamyelocytes 0.3 (H) 0 10e9/L    Anisocytosis Slight     Macrocytes Present    Calcium ionized   Result Value Ref Range    Calcium Ionized 4.6 4.4 - 5.2 mg/dL   Magnesium   Result Value Ref Range    Magnesium 1.6 1.6 - 2.3 mg/dL   Basic metabolic panel   Result Value Ref Range    Sodium 144 133 - 144 mmol/L    Potassium 3.4 3.4 - 5.3 mmol/L    Chloride 106 94 - 109 mmol/L    Carbon Dioxide 32 20 - 32 mmol/L    Anion Gap 7 3 - 14 mmol/L    Glucose 93 70 - 99 mg/dL    Urea Nitrogen 10 7 - 30 mg/dL    Creatinine 0.61 0.52 - 1.04 mg/dL    GFR Estimate >90  Non  GFR Calc   >60 mL/min/1.7m2    GFR Estimate If Black >90   GFR Calc   >60 mL/min/1.7m2    Calcium 8.0 (L) 8.5 - 10.1 mg/dL            Procedure Summary:   An ~ 5 hour PBSC collection  is currently being  performed. The  R chest catheter  was used for access. ACD-A was used   for anticoagulation. To offset the effects of the citrate, calcium gluconate was given in the return line. The patient's vital signs are stable and  is tolerating the procedure well.      Attestation:   This patient has been seen and evaluated by me, Quirino Gaming.  Quirino Gaming   Division of Transfusion Medicine   [place] [address]   Pager: [phone] or [phone]

## 2022-10-27 NOTE — PROGRESS NOTES
"Subjective:      Patient ID: Blanca Thomas is a 52 y.o. female.    Chief Complaint: Follow-up (RT FOOT FRACTURE )     is a 52 y.o. female who presents to the podiatry clinic  with complaint of  right foot pain. Onset of the symptoms was about a month ago. 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 Current symptoms include:  pain and swelling R foot . Aggravating factors: any weight bearing. Symptoms have progressed to a point and plateaued. Patient has had no prior foot problems. Evaluation to date: plain films: abnormal fracture base of R 5th metatrsal, non displaced, non angulated/nondeviated . Treatment to date:  went to ER where they obtained xrays, provided a short CAM boot and crutches. States she has been off the foot for the past 3-4 weeks . Patients rates pain 6/10 on pain scale.    10/07/2022: follow up for R foot 5th met base fracture. Has been keeping off foot with wheelchair and crutches. No pain. Had xray done which showed healing fracture of 5th met base.  No other complaints at this time.     10/27/2022: follow up for R 5th met base fracture. Using CAM boot and crutches as directed and has 0/10 pain. Doing well. Here for further recommendations.       Vitals:    10/27/22 1336   BP: 127/78   Pulse: 76   Resp: 18   Weight: 86.2 kg (190 lb)   Height: 5' 5" (1.651 m)   PainSc: 0-No pain     Review of Systems   Constitutional: Negative for chills and fever.   Cardiovascular:  Negative for chest pain, claudication and leg swelling.   Respiratory:  Negative for cough and shortness of breath.    Skin:  Negative for dry skin and nail changes.   Musculoskeletal:         R foot pain resolved   Gastrointestinal:  Negative for nausea and vomiting.   Neurological:  Negative for numbness and paresthesias.   Psychiatric/Behavioral:  Negative for altered mental status.          Objective:      Physical Exam  Vitals reviewed. "   Constitutional:       Appearance: She is well-developed.   HENT:      Head: Normocephalic.   Cardiovascular:      Pulses:           Dorsalis pedis pulses are 2+ on the right side and 2+ on the left side.        Posterior tibial pulses are 2+ on the right side and 2+ on the left side.      Comments: CRT < 3 sec to tips of toes.   Pulmonary:      Effort: No respiratory distress.   Musculoskeletal:      Right lower leg: Edema (foot, improved) present.      Left lower leg: No edema.      Comments: No further pain with palpation and ROM of 5th metatarsal R foot. No pain on ROM of 5th ray R foot. No palpable bony hypertrophy along R lateral foot. Otherwise rectus foot and toe position bilateral with no major deformities noted. Mild equinus noted b/l ankles with < 10 deg DF noted. MMT 5/5 in DF/PF/Inv/Ev resistance with no reproduction of pain in any direction. Passive range of motion of ankle and pedal joints is painless b/l.     Skin:     General: Skin is warm and dry.      Findings: No erythema.      Comments: No open lesions, lacerations or wounds noted. Nails are normal to R 1-5 and L 1-5. Interdigital spaces clean, dry and intact b/l. No erythema noted to b/l foot. Skin texture normal. Pedal hair normal. No ecchymosis or bruising noted to R foot. Skin temperature normal b/l foot.      Neurological:      Mental Status: She is alert and oriented to person, place, and time.      Sensory: No sensory deficit.      Comments: Light touch, proprioception, and sharp/dull sensation are all intact bilaterally.    Psychiatric:         Behavior: Behavior normal.         Thought Content: Thought content normal.         Judgment: Judgment normal.             Assessment:       Encounter Diagnosis   Name Primary?    Fracture of base of fifth metatarsal bone with routine healing, right Yes           Plan:        was seen today for follow-up.    Diagnoses and all orders for this visit:    Fracture of base of fifth metatarsal  bone with routine healing, right      I counseled the patient on her conditions, their implications and medical management.    Xray reviewed noting continued (90%) healing fracture of 5th met base. Stable.     OTC NSAIDs as needed/directed for pain relief.     D/c CAM boot and crutches. Switch to Darco shoe. Use for next 1-2 weeks and switch to supportive athletic style shoe if no pain or problems.     Continue ACE as needed for swelling.     Work noted provided to remain off work for additional 3 weeks, until further notice.     RTC 3 weeks, sooner PRN

## 2022-10-27 NOTE — LETTER
October 27, 2022      [id number]  [address]  [facility] [address]  Phone: [phone]       Patient: Blanca Thomas   YOB: 1970  Date of Visit: 10/27/2022    To Whom It May Concern:    Evon Thomas  was at [facility] on 10/27/2022. The patient is to remain off work for additional 3 weeks. She may return to work after this with no restrictions. If you have any questions or concerns, or if I can be of further assistance, please do not hesitate to contact me.    Sincerely,    Jon Shaikh DPM

## 2022-11-23 ENCOUNTER — OFFICE VISIT (OUTPATIENT)
Dept: PODIATRY | Facility: CLINIC | Age: 52
End: 2022-11-23
Payer: OTHER MISCELLANEOUS

## 2022-11-23 ENCOUNTER — TELEPHONE (OUTPATIENT)
Dept: PODIATRY | Facility: CLINIC | Age: 52
End: 2022-11-23

## 2022-11-23 ENCOUNTER — HOSPITAL ENCOUNTER (OUTPATIENT)
Dept: RADIOLOGY | Facility: HOSPITAL | Age: 52
Discharge: HOME | End: 2022-11-23
Attending: PODIATRIST

## 2022-11-23 VITALS
SYSTOLIC BLOOD PRESSURE: 135 MMHG | BODY MASS INDEX: 31.67 KG/M2 | DIASTOLIC BLOOD PRESSURE: 79 MMHG | HEIGHT: 65 IN | HEART RATE: 87 BPM | WEIGHT: 190.06 LBS

## 2022-11-23 DIAGNOSIS — S92.351D FRACTURE OF BASE OF FIFTH METATARSAL BONE WITH ROUTINE HEALING, RIGHT: Primary | ICD-10-CM

## 2022-11-23 DIAGNOSIS — S92.351D FRACTURE OF BASE OF FIFTH METATARSAL BONE WITH ROUTINE HEALING, RIGHT: ICD-10-CM

## 2022-11-23 PROCEDURE — 99999 PR PBB SHADOW E&M-EST. PATIENT-LVL IV: ICD-10-PCS | Mod: PBBFAC,,, | Performed by: PODIATRIST

## 2022-11-23 PROCEDURE — 99999 PR PBB SHADOW E&M-EST. PATIENT-LVL IV: CPT | Mod: PBBFAC,,, | Performed by: PODIATRIST

## 2022-11-23 PROCEDURE — 73630 XR FOOT COMPLETE 3 VIEW RIGHT: ICD-10-PCS | Mod: 26,RT,, | Performed by: RADIOLOGY

## 2022-11-23 PROCEDURE — 73630 X-RAY EXAM OF FOOT: CPT | Mod: TC,RT

## 2022-11-23 PROCEDURE — 73630 X-RAY EXAM OF FOOT: CPT | Mod: 26,RT,, | Performed by: RADIOLOGY

## 2022-11-23 PROCEDURE — 99214 OFFICE O/P EST MOD 30 MIN: CPT | Mod: PBBFAC | Performed by: PODIATRIST

## 2022-11-23 PROCEDURE — 99213 PR OFFICE/OUTPT VISIT, EST, LEVL III, 20-29 MIN: ICD-10-PCS | Mod: S$GLB,,, | Performed by: PODIATRIST

## 2022-11-23 PROCEDURE — 99213 OFFICE O/P EST LOW 20 MIN: CPT | Mod: S$GLB,,, | Performed by: PODIATRIST

## 2022-11-23 NOTE — LETTER
November 23, 2022      [id number]  [address]  [facility] [address]  Phone: [phone]       Patient: Blanca Thomas   YOB: 1970  Date of Visit: 11/23/2022    To Whom It May Concern:    Evon Thomas  was at [facility] on 11/23/2022. The patient is to remain off work until follow up xray. Work restriction will likely be lifted if her xray appears improved. If you have any questions or concerns, or if I can be of further assistance, please do not hesitate to contact me.    Sincerely,    Jon Shaikh DPM

## 2022-11-23 NOTE — PROGRESS NOTES
"Subjective:      Patient ID: Blanca Thomas is a 52 y.o. female.    Chief Complaint: Foot Pain (Right foot(swollen)) and Diabetes Mellitus (PCP- 08/2022)     is a 52 y.o. female who presents to the podiatry clinic  with complaint of  right foot pain. Onset of the symptoms was about a month ago. 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 Current symptoms include:  pain and swelling R foot . Aggravating factors: any weight bearing. Symptoms have progressed to a point and plateaued. Patient has had no prior foot problems. Evaluation to date: plain films: abnormal fracture base of R 5th metatrsal, non displaced, non angulated/nondeviated . Treatment to date:  went to ER where they obtained xrays, provided a short CAM boot and crutches. States she has been off the foot for the past 3-4 weeks . Patients rates pain 6/10 on pain scale.    10/07/2022: follow up for R foot 5th met base fracture. Has been keeping off foot with wheelchair and crutches. No pain. Had xray done which showed healing fracture of 5th met base.  No other complaints at this time.     10/27/2022: follow up for R 5th met base fracture. Using CAM boot and crutches as directed and has 0/10 pain. Doing well. Here for further recommendations.     11/23/2022: follow up for R 5th met base fracture. Doing well. Using Darco shoe as directed. Hurts only on occasion but most of the time she is pain free. Still swelling a little. No new concerns.     Vitals:    11/23/22 1123   BP: 135/79   Pulse: 87   Weight: 86.2 kg (190 lb 0.6 oz)   Height: 5' 5" (1.651 m)   PainSc: 0-No pain     Review of Systems   Constitutional: Negative for chills and fever.   Cardiovascular:  Negative for chest pain, claudication and leg swelling.   Respiratory:  Negative for cough and shortness of breath.    Skin:  Negative for dry skin and nail changes.   Musculoskeletal:         R foot pain resolved "   Gastrointestinal:  Negative for nausea and vomiting.   Neurological:  Negative for numbness and paresthesias.   Psychiatric/Behavioral:  Negative for altered mental status.          Objective:      Physical Exam  Vitals reviewed.   Constitutional:       Appearance: She is well-developed.   HENT:      Head: Normocephalic.   Cardiovascular:      Pulses:           Dorsalis pedis pulses are 2+ on the right side and 2+ on the left side.        Posterior tibial pulses are 2+ on the right side and 2+ on the left side.      Comments: CRT < 3 sec to tips of toes.   Pulmonary:      Effort: No respiratory distress.   Musculoskeletal:      Right lower leg: Edema (foot, improved) present.      Left lower leg: No edema.      Comments: No further pain with palpation and ROM of 5th metatarsal R foot. No pain on ROM of 5th ray R foot. No palpable bony hypertrophy along R lateral foot. Otherwise rectus foot and toe position bilateral with no major deformities noted. Mild equinus noted b/l ankles with < 10 deg DF noted. MMT 5/5 in DF/PF/Inv/Ev resistance with no reproduction of pain in any direction. Passive range of motion of ankle and pedal joints is painless b/l.     Skin:     General: Skin is warm and dry.      Findings: No erythema.      Comments: No open lesions, lacerations or wounds noted. Nails are normal to R 1-5 and L 1-5. Interdigital spaces clean, dry and intact b/l. No erythema noted to b/l foot. Skin texture normal. Pedal hair normal. No ecchymosis or bruising noted to R foot. Skin temperature normal b/l foot.      Neurological:      Mental Status: She is alert and oriented to person, place, and time.      Sensory: No sensory deficit.      Comments: Light touch, proprioception, and sharp/dull sensation are all intact bilaterally.    Psychiatric:         Behavior: Behavior normal.         Thought Content: Thought content normal.         Judgment: Judgment normal.             Assessment:       Encounter Diagnosis   Name  Primary?    Fracture of base of fifth metatarsal bone with routine healing, right Yes             Plan:        was seen today for foot pain and diabetes mellitus.    Diagnoses and all orders for this visit:    Fracture of base of fifth metatarsal bone with routine healing, right  -     X-Ray Foot Complete Right; Future        I counseled the patient on her conditions, their implications and medical management.    Xray ordered to follow up right foot healing of 5th met base fracture.     OTC NSAIDs as needed/directed for pain relief.     D/c Darco shoe. Switch to supportive athletic style shoe if no pain or problems. Revert back to Darco shoe if pain returns.     Work noted provided to remain off work until xray results available.     RTC 4 weeks for final check, sooner PRN

## 2022-11-23 NOTE — TELEPHONE ENCOUNTER
----- Message from Jon Shaikh DPM sent at 11/23/2022  3:57 PM CST -----  Please call her and let her know that her xray still shows fracture however it is in great alignment and 50-70 healed. She can resume work but if her pain returns, she should discontinue but if she has no pain she can resume work. Follow up as scheduled. Thanks. She is Sinhala speaking.

## 2022-11-23 NOTE — TELEPHONE ENCOUNTER
Spoke with patient concerning her x-ray results               Per Dr. Jon Shaikh:  Please call her and let her know that her xray still shows fracture however it is in great alignment and 50-70 healed. She can resume work but if her pain returns, she should discontinue but if she has no pain she can resume work. Follow up as scheduled. Thanks. She is [place] speaking

## 2022-12-27 ENCOUNTER — OFFICE VISIT (OUTPATIENT)
Dept: PODIATRY | Facility: CLINIC | Age: 52
End: 2022-12-27

## 2022-12-27 VITALS
WEIGHT: 190 LBS | DIASTOLIC BLOOD PRESSURE: 84 MMHG | HEART RATE: 86 BPM | SYSTOLIC BLOOD PRESSURE: 137 MMHG | HEIGHT: 65 IN | BODY MASS INDEX: 31.65 KG/M2

## 2022-12-27 DIAGNOSIS — M77.51 TENDINITIS OF RIGHT ANKLE: ICD-10-CM

## 2022-12-27 DIAGNOSIS — S92.351D FRACTURE OF BASE OF FIFTH METATARSAL BONE WITH ROUTINE HEALING, RIGHT: Primary | ICD-10-CM

## 2022-12-27 PROCEDURE — 99999 PR PBB SHADOW E&M-EST. PATIENT-LVL III: ICD-10-PCS | Mod: PBBFAC,,, | Performed by: PODIATRIST

## 2022-12-27 PROCEDURE — 99213 PR OFFICE/OUTPT VISIT, EST, LEVL III, 20-29 MIN: ICD-10-PCS | Mod: S$PBB,,, | Performed by: PODIATRIST

## 2022-12-27 PROCEDURE — 99999 PR PBB SHADOW E&M-EST. PATIENT-LVL III: CPT | Mod: PBBFAC,,, | Performed by: PODIATRIST

## 2022-12-27 PROCEDURE — 99213 OFFICE O/P EST LOW 20 MIN: CPT | Mod: PBBFAC | Performed by: PODIATRIST

## 2022-12-27 PROCEDURE — 99213 OFFICE O/P EST LOW 20 MIN: CPT | Mod: S$PBB,,, | Performed by: PODIATRIST

## 2022-12-27 RX ORDER — DICLOFENAC SODIUM 10 MG/G
2 GEL TOPICAL 3 TIMES DAILY
Qty: 100 G | Refills: 3 | Status: SHIPPED | OUTPATIENT
Start: 2022-12-27

## 2022-12-27 NOTE — PROGRESS NOTES
"Subjective:      Patient ID: Blanca Thomas is a 52 y.o. female.    Chief Complaint: Follow-up (Right Foot FX)       is a 52 y.o. female who presents to the podiatry clinic  with complaint of  right foot pain. Onset of the symptoms was about a month ago. 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 Current symptoms include:  pain and swelling R foot . Aggravating factors: any weight bearing. Symptoms have progressed to a point and plateaued. Patient has had no prior foot problems. Evaluation to date: plain films: abnormal fracture base of R 5th metatrsal, non displaced, non angulated/nondeviated . Treatment to date:  went to ER where they obtained xrays, provided a short CAM boot and crutches. States she has been off the foot for the past 3-4 weeks . Patients rates pain 6/10 on pain scale.    10/07/2022: follow up for R foot 5th met base fracture. Has been keeping off foot with wheelchair and crutches. No pain. Had xray done which showed healing fracture of 5th met base.  No other complaints at this time.     10/27/2022: follow up for R 5th met base fracture. Using CAM boot and crutches as directed and has 0/10 pain. Doing well. Here for further recommendations.     11/23/2022: follow up for R 5th met base fracture. Doing well. Using Darco shoe as directed. Hurts only on occasion but most of the time she is pain free. Still swelling a little. No new concerns.     12/27/2022: follow up for right 5th met base fracture. Pain improved in area of fracture but having some pain at the level of ankle behind the fracture. Would like to get assessed for recommendations.       Vitals:    12/27/22 1405   BP: 137/84   Pulse: 86   Weight: 86.2 kg (190 lb)   Height: 5' 5" (1.651 m)   PainSc:   6   PainLoc: Foot     Review of Systems   Constitutional: Negative for chills and fever.   Cardiovascular:  Negative for chest pain, claudication and leg " swelling.   Respiratory:  Negative for cough and shortness of breath.    Skin:  Negative for dry skin and nail changes.   Musculoskeletal:         R foot pain recurrence    Gastrointestinal:  Negative for nausea and vomiting.   Neurological:  Negative for numbness and paresthesias.   Psychiatric/Behavioral:  Negative for altered mental status.          Objective:      Physical Exam  Vitals reviewed.   Constitutional:       Appearance: She is well-developed.   HENT:      Head: Normocephalic.   Cardiovascular:      Pulses:           Dorsalis pedis pulses are 2+ on the right side and 2+ on the left side.        Posterior tibial pulses are 2+ on the right side and 2+ on the left side.      Comments: CRT < 3 sec to tips of toes.   Pulmonary:      Effort: No respiratory distress.   Musculoskeletal:      Right lower leg: Edema (foot, improved) present.      Left lower leg: No edema.      Comments: No further pain with palpation and ROM of 5th metatarsal R foot. No pain on ROM of 5th ray R foot. No palpable bony hypertrophy along R lateral foot. Mild pain with palpation of peroneus tertius tendon along distal lateral R ankle and onto its insertion on the 5th metatarsal shaft. Otherwise rectus foot and toe position bilateral with no major deformities noted. Mild equinus noted b/l ankles with < 10 deg DF noted. MMT 5/5 in DF/PF/Inv/Ev resistance with no reproduction of pain in any direction. Passive range of motion of ankle and pedal joints is painless b/l.     Skin:     General: Skin is warm and dry.      Findings: No erythema.      Comments: No open lesions, lacerations or wounds noted. Nails are normal to R 1-5 and L 1-5. Interdigital spaces clean, dry and intact b/l. No erythema noted to b/l foot. Skin texture normal. Pedal hair normal. No ecchymosis or bruising noted to R foot. Skin temperature normal b/l foot.      Neurological:      Mental Status: She is alert and oriented to person, place, and time.      Sensory: No  sensory deficit.      Comments: Light touch, proprioception, and sharp/dull sensation are all intact bilaterally.    Psychiatric:         Behavior: Behavior normal.         Thought Content: Thought content normal.         Judgment: Judgment normal.             Assessment:       Encounter Diagnoses   Name Primary?    Fracture of base of fifth metatarsal bone with routine healing, right Yes    Tendinitis of right ankle                Plan:        was seen today for follow-up.    Diagnoses and all orders for this visit:    Fracture of base of fifth metatarsal bone with routine healing, right  -     X-Ray Foot Complete Right; Future    Tendinitis of right ankle  -     X-Ray Foot Complete Right; Future    Other orders  -     diclofenac sodium (VOLTAREN) 1 % Gel; Apply 2 g topically 3 (three) times daily. Apply to the area of pain 2-3x per day or night as needed         I counseled the patient on her conditions, their implications and medical management.    Xray ordered to follow up right foot healing of 5th met base fracture. Final xray likely.     Rx Voltaren gel to be applied to affected area up to 3-4 x daily as needed for pain. For R lateral ankle and foot pain.     Continue supportive shoes as directed.     Massage and ice area for pain relief as directed.     Continue work as scheduled.     RTC 4 weeks, sooner PRN for R peroneus tertius tendinitis check, consider PT.

## 2023-03-03 ENCOUNTER — TELEPHONE (OUTPATIENT)
Dept: PODIATRY | Facility: CLINIC | Age: 53
End: 2023-03-03

## 2023-03-03 ENCOUNTER — HOSPITAL ENCOUNTER (OUTPATIENT)
Dept: RADIOLOGY | Facility: HOSPITAL | Age: 53
Discharge: HOME | End: 2023-03-03
Attending: PODIATRIST

## 2023-03-03 ENCOUNTER — PATIENT MESSAGE (OUTPATIENT)
Dept: PODIATRY | Facility: CLINIC | Age: 53
End: 2023-03-03

## 2023-03-03 DIAGNOSIS — M77.51 TENDINITIS OF RIGHT ANKLE: ICD-10-CM

## 2023-03-03 DIAGNOSIS — S92.351D FRACTURE OF BASE OF FIFTH METATARSAL BONE WITH ROUTINE HEALING, RIGHT: ICD-10-CM

## 2023-03-03 PROCEDURE — 73630 X-RAY EXAM OF FOOT: CPT | Mod: 26,RT,, | Performed by: RADIOLOGY

## 2023-03-03 PROCEDURE — 73630 XR FOOT COMPLETE 3 VIEW RIGHT: ICD-10-PCS | Mod: 26,RT,, | Performed by: RADIOLOGY

## 2023-03-03 PROCEDURE — 73630 X-RAY EXAM OF FOOT: CPT | Mod: TC,RT

## 2023-03-03 NOTE — TELEPHONE ENCOUNTER
Patient rescheduled to 3/23 rescheduled from 3/3. Patient requesting a letter from missing work today. Letter in chart please print out.

## 2023-03-06 ENCOUNTER — PATIENT MESSAGE (OUTPATIENT)
Dept: PODIATRY | Facility: CLINIC | Age: 53
End: 2023-03-06

## 2023-03-23 ENCOUNTER — HOSPITAL ENCOUNTER (OUTPATIENT)
Dept: RADIOLOGY | Facility: HOSPITAL | Age: 53
Discharge: HOME | End: 2023-03-23
Attending: PODIATRIST
Payer: OTHER MISCELLANEOUS

## 2023-03-23 ENCOUNTER — OFFICE VISIT (OUTPATIENT)
Dept: PODIATRY | Facility: CLINIC | Age: 53
End: 2023-03-23

## 2023-03-23 VITALS
DIASTOLIC BLOOD PRESSURE: 76 MMHG | WEIGHT: 198.88 LBS | HEART RATE: 71 BPM | SYSTOLIC BLOOD PRESSURE: 144 MMHG | BODY MASS INDEX: 33.13 KG/M2 | HEIGHT: 65 IN

## 2023-03-23 DIAGNOSIS — M79.671 FOOT PAIN, RIGHT: ICD-10-CM

## 2023-03-23 DIAGNOSIS — S92.351G CLOSED FRACTURE OF BASE OF FIFTH METATARSAL BONE WITH DELAYED HEALING, RIGHT: ICD-10-CM

## 2023-03-23 DIAGNOSIS — M76.71 PERONEAL TENDINITIS, RIGHT: Primary | ICD-10-CM

## 2023-03-23 DIAGNOSIS — M76.71 PERONEAL TENDINITIS, RIGHT: ICD-10-CM

## 2023-03-23 PROCEDURE — 73718 MRI LOWER EXTREMITY W/O DYE: CPT | Mod: 26,RT,, | Performed by: RADIOLOGY

## 2023-03-23 PROCEDURE — 99213 OFFICE O/P EST LOW 20 MIN: CPT | Mod: S$PBB,,, | Performed by: PODIATRIST

## 2023-03-23 PROCEDURE — 99214 OFFICE O/P EST MOD 30 MIN: CPT | Mod: PBBFAC,25 | Performed by: PODIATRIST

## 2023-03-23 PROCEDURE — 99999 PR PBB SHADOW E&M-EST. PATIENT-LVL IV: CPT | Mod: PBBFAC,,, | Performed by: PODIATRIST

## 2023-03-23 PROCEDURE — 99213 PR OFFICE/OUTPT VISIT, EST, LEVL III, 20-29 MIN: ICD-10-PCS | Mod: S$PBB,,, | Performed by: PODIATRIST

## 2023-03-23 PROCEDURE — 73718 MRI LOWER EXTREMITY W/O DYE: CPT | Mod: TC,RT

## 2023-03-23 PROCEDURE — 99999 PR PBB SHADOW E&M-EST. PATIENT-LVL IV: ICD-10-PCS | Mod: PBBFAC,,, | Performed by: PODIATRIST

## 2023-03-23 PROCEDURE — 73718 MRI FOOT (MIDFOOT) RIGHT WITHOUT CONTRAST: ICD-10-PCS | Mod: 26,RT,, | Performed by: RADIOLOGY

## 2023-03-23 RX ORDER — TRAMADOL HYDROCHLORIDE 50 MG/1
50 TABLET ORAL EVERY 8 HOURS PRN
Qty: 21 TABLET | Refills: 0 | Status: SHIPPED | OUTPATIENT
Start: 2023-03-23 | End: 2023-03-30

## 2023-03-23 NOTE — PROGRESS NOTES
Subjective:      Patient ID: Blanca Thomas is a 53 y.o. female.    Chief Complaint: Follow-up (Right foot)       is a 53 y.o. female who presents to the podiatry clinic  with complaint of  right foot pain. Onset of the symptoms was about a month ago. 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 Current symptoms include:  pain and swelling R foot . Aggravating factors: any weight bearing. Symptoms have progressed to a point and plateaued. Patient has had no prior foot problems. Evaluation to date: plain films: abnormal fracture base of R 5th metatrsal, non displaced, non angulated/nondeviated . Treatment to date:  went to ER where they obtained xrays, provided a short CAM boot and crutches. States she has been off the foot for the past 3-4 weeks . Patients rates pain 6/10 on pain scale.    10/07/2022: follow up for R foot 5th met base fracture. Has been keeping off foot with wheelchair and crutches. No pain. Had xray done which showed healing fracture of 5th met base.  No other complaints at this time.     10/27/2022: follow up for R 5th met base fracture. Using CAM boot and crutches as directed and has 0/10 pain. Doing well. Here for further recommendations.     11/23/2022: follow up for R 5th met base fracture. Doing well. Using Darco shoe as directed. Hurts only on occasion but most of the time she is pain free. Still swelling a little. No new concerns.     12/27/2022: follow up for right 5th met base fracture. Pain improved in area of fracture but having some pain at the level of ankle behind the fracture. Would like to get assessed for recommendations.     03/23/2023: follow up for R 5th met base fracture. States since she got out of the darco shoe and started working again, her foot started to hurt and become swollen and red again in the area of her fracture. Still hurts at about 8/10 and is inquiring about additional  "assessment and recommendations. Has been problematic now for 7 months. Most recent xray from 3/3/23 shows faint fracture line with possible non union/delayed union.       Vitals:    03/23/23 1410   BP: (!) 144/76   Pulse: 71   Weight: 90.2 kg (198 lb 13.7 oz)   Height: 5' 5" (1.651 m)   PainSc:   8   PainLoc: Foot     Review of Systems   Constitutional: Negative for chills and fever.   Cardiovascular:  Negative for chest pain, claudication and leg swelling.   Respiratory:  Negative for cough and shortness of breath.    Skin:  Negative for dry skin and nail changes.   Musculoskeletal:         R foot pain recurrence    Gastrointestinal:  Negative for nausea and vomiting.   Neurological:  Negative for numbness and paresthesias.   Psychiatric/Behavioral:  Negative for altered mental status.          Objective:      Physical Exam  Vitals reviewed.   Constitutional:       Appearance: She is well-developed.   HENT:      Head: Normocephalic.   Cardiovascular:      Pulses:           Dorsalis pedis pulses are 2+ on the right side and 2+ on the left side.        Posterior tibial pulses are 2+ on the right side and 2+ on the left side.      Comments: CRT < 3 sec to tips of toes.   Pulmonary:      Effort: No respiratory distress.   Musculoskeletal:      Right lower leg: Edema (foot, improved) present.      Left lower leg: No edema.      Comments: Mild to moderate recurrence of pain with palpation and ROM of 5th metatarsal R foot. Mild to moderate pain with ROM of 5th ray R foot. No palpable bony hypertrophy along R lateral foot. Mild pain with palpation of peroneus tertius and peroneus brevis tendons along distal lateral R ankle and onto its insertion on the 5th metatarsal shaft. Otherwise rectus foot and toe position bilateral with no major deformities noted. Mild equinus noted b/l ankles with < 10 deg DF noted. MMT 5/5 in DF/PF/Inv/Ev resistance with no reproduction of pain in any direction. Passive range of motion of ankle " and pedal joints is painless b/l.     Skin:     General: Skin is warm and dry.      Findings: No erythema.      Comments: No open lesions, lacerations or wounds noted. Nails are normal to R 1-5 and L 1-5. Interdigital spaces clean, dry and intact b/l. No erythema noted to b/l foot. Skin texture normal. Pedal hair normal. No ecchymosis or bruising noted to R foot. Skin temperature normal b/l foot.      Neurological:      Mental Status: She is alert and oriented to person, place, and time.      Sensory: No sensory deficit.      Comments: Light touch, proprioception, and sharp/dull sensation are all intact bilaterally.    Psychiatric:         Behavior: Behavior normal.         Thought Content: Thought content normal.         Judgment: Judgment normal.             Assessment:       Encounter Diagnoses   Name Primary?    Peroneal tendinitis, right Yes    Closed fracture of base of fifth metatarsal bone with delayed healing, right     Foot pain, right                  Plan:        was seen today for follow-up.    Diagnoses and all orders for this visit:    Peroneal tendinitis, right  -     MRI Foot (Midfoot) Right Without Contrast; Future    Closed fracture of base of fifth metatarsal bone with delayed healing, right  -     MRI Foot (Midfoot) Right Without Contrast; Future    Foot pain, right  -     MRI Foot (Midfoot) Right Without Contrast; Future    Other orders  -     traMADoL (ULTRAM) 50 mg tablet; Take 1 tablet (50 mg total) by mouth every 8 (eight) hours as needed for Pain.           I counseled the patient on her conditions, their implications and medical management.    Xray reviewed noting not fully healed fracture site (7 months since original injury).     Rx Voltaren gel to be applied to affected area up to 3-4 x daily as needed for pain. For R lateral ankle and foot pain. Continue.     Rx tramadol 50mg q6h for pain.  Advised to take as directed only when pain is severe.     Dispensed and applied ankle  brace to affected side for added support of the joint and to decrease motion and improve pain and stability. Advised to use at all times while ambulating.     MRI ordered of right midfoot to assess soft tissues for injury and/or damage given equivocal xray results. Rule out soft tissue abnormality.     Continue supportive shoes with ankle brace as directed.      RTC within 2-3 days after MRI, sooner PRN

## 2023-03-24 ENCOUNTER — TELEPHONE (OUTPATIENT)
Dept: PODIATRY | Facility: CLINIC | Age: 53
End: 2023-03-24
Payer: COMMERCIAL

## 2023-03-24 NOTE — TELEPHONE ENCOUNTER
Spoke with patient concerning her MRI results per: Dr. Shaikh(Podiatry)          ----- Message from Jon Shaikh DPM sent at 3/24/2023 12:37 PM CDT -----  Please call her and let her know that her MRI shows that the fracture is not 100% healed yet. It also shows arthritis in the joint near the fracture as well as an irritated tendon near the area. All of this combined is likely causing her pain not just the fracture. For now, she should continue with the pain medication and the ankle brace and follow up next week for further recommendations. Thanks.

## 2023-03-29 ENCOUNTER — OFFICE VISIT (OUTPATIENT)
Dept: PODIATRY | Facility: CLINIC | Age: 53
End: 2023-03-29

## 2023-03-29 VITALS
BODY MASS INDEX: 33.13 KG/M2 | SYSTOLIC BLOOD PRESSURE: 138 MMHG | HEIGHT: 65 IN | WEIGHT: 198.88 LBS | HEART RATE: 67 BPM | DIASTOLIC BLOOD PRESSURE: 74 MMHG

## 2023-03-29 DIAGNOSIS — M19.071 ARTHRITIS OF FOOT, RIGHT: Primary | ICD-10-CM

## 2023-03-29 DIAGNOSIS — S92.351D FRACTURE OF BASE OF FIFTH METATARSAL BONE OF RIGHT FOOT WITH ROUTINE HEALING, SUBSEQUENT ENCOUNTER: ICD-10-CM

## 2023-03-29 DIAGNOSIS — M65.9: ICD-10-CM

## 2023-03-29 PROCEDURE — 99999 PR PBB SHADOW E&M-EST. PATIENT-LVL V: ICD-10-PCS | Mod: PBBFAC,,, | Performed by: PODIATRIST

## 2023-03-29 PROCEDURE — 99999 PR PBB SHADOW E&M-EST. PATIENT-LVL V: CPT | Mod: PBBFAC,,, | Performed by: PODIATRIST

## 2023-03-29 PROCEDURE — 20605 DRAIN/INJ JOINT/BURSA W/O US: CPT | Mod: PBBFAC | Performed by: PODIATRIST

## 2023-03-29 PROCEDURE — 20605 DRAIN/INJ JOINT/BURSA W/O US: CPT | Mod: S$PBB,RT,, | Performed by: PODIATRIST

## 2023-03-29 PROCEDURE — 99213 PR OFFICE/OUTPT VISIT, EST, LEVL III, 20-29 MIN: ICD-10-PCS | Mod: 25,S$PBB,, | Performed by: PODIATRIST

## 2023-03-29 PROCEDURE — 99215 OFFICE O/P EST HI 40 MIN: CPT | Mod: PBBFAC | Performed by: PODIATRIST

## 2023-03-29 PROCEDURE — 99213 OFFICE O/P EST LOW 20 MIN: CPT | Mod: 25,S$PBB,, | Performed by: PODIATRIST

## 2023-03-29 PROCEDURE — 20605 PR DRAIN/INJECT INTERMEDIATE JOINT/BURSA: ICD-10-PCS | Mod: S$PBB,RT,, | Performed by: PODIATRIST

## 2023-03-29 RX ORDER — TRIAMCINOLONE ACETONIDE 40 MG/ML
20 INJECTION, SUSPENSION INTRA-ARTICULAR; INTRAMUSCULAR ONCE
Status: COMPLETED | OUTPATIENT
Start: 2023-03-29 | End: 2023-03-29

## 2023-03-29 RX ORDER — DEXAMETHASONE SODIUM PHOSPHATE 4 MG/ML
2 INJECTION, SOLUTION INTRA-ARTICULAR; INTRALESIONAL; INTRAMUSCULAR; INTRAVENOUS; SOFT TISSUE ONCE
Status: COMPLETED | OUTPATIENT
Start: 2023-03-29 | End: 2023-03-29

## 2023-03-29 RX ADMIN — TRIAMCINOLONE ACETONIDE 20 MG: 40 INJECTION, SUSPENSION INTRA-ARTICULAR; INTRAMUSCULAR at 08:03

## 2023-03-29 RX ADMIN — DEXAMETHASONE SODIUM PHOSPHATE 2 MG: 4 INJECTION, SOLUTION INTRA-ARTICULAR; INTRALESIONAL; INTRAMUSCULAR; INTRAVENOUS; SOFT TISSUE at 08:03

## 2023-03-29 NOTE — PROGRESS NOTES
Subjective:      Patient ID: Blanca Thomas is a 53 y.o. female.    Chief Complaint: Results (MRI- right foot)       is a 53 y.o. female who presents to the podiatry clinic  with complaint of  right foot pain. Onset of the symptoms was about a month ago. 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 Current symptoms include:  pain and swelling R foot . Aggravating factors: any weight bearing. Symptoms have progressed to a point and plateaued. Patient has had no prior foot problems. Evaluation to date: plain films: abnormal fracture base of R 5th metatrsal, non displaced, non angulated/nondeviated . Treatment to date:  went to ER where they obtained xrays, provided a short CAM boot and crutches. States she has been off the foot for the past 3-4 weeks . Patients rates pain 6/10 on pain scale.    10/07/2022: follow up for R foot 5th met base fracture. Has been keeping off foot with wheelchair and crutches. No pain. Had xray done which showed healing fracture of 5th met base.  No other complaints at this time.     10/27/2022: follow up for R 5th met base fracture. Using CAM boot and crutches as directed and has 0/10 pain. Doing well. Here for further recommendations.     11/23/2022: follow up for R 5th met base fracture. Doing well. Using Darco shoe as directed. Hurts only on occasion but most of the time she is pain free. Still swelling a little. No new concerns.     12/27/2022: follow up for right 5th met base fracture. Pain improved in area of fracture but having some pain at the level of ankle behind the fracture. Would like to get assessed for recommendations.     03/23/2023: follow up for R 5th met base fracture. States since she got out of the darco shoe and started working again, her foot started to hurt and become swollen and red again in the area of her fracture. Still hurts at about 8/10 and is inquiring about additional  "assessment and recommendations. Has been problematic now for 7 months. Most recent xray from 3/3/23 shows faint fracture line with possible non union/delayed union.     03/29/2023: Follow up for R foot pain and injury. Due to continued pain, patient had MRI done which shows incompletely healed fracture of 5th met base and CC joint arthritis as well as tenosynovitis of PB tendon. She is here for further recommendations.      Vitals:    03/29/23 0809   BP: 138/74   Pulse: 67   Weight: 90.2 kg (198 lb 13.7 oz)   Height: 5' 5" (1.651 m)   PainSc:   8   PainLoc: Foot     Review of Systems   Constitutional: Negative for chills and fever.   Cardiovascular:  Negative for chest pain, claudication and leg swelling.   Respiratory:  Negative for cough and shortness of breath.    Skin:  Negative for dry skin and nail changes.   Musculoskeletal:         R foot pain recurrence    Gastrointestinal:  Negative for nausea and vomiting.   Neurological:  Negative for numbness and paresthesias.   Psychiatric/Behavioral:  Negative for altered mental status.          Objective:      Physical Exam  Vitals reviewed.   Constitutional:       Appearance: She is well-developed.   HENT:      Head: Normocephalic.   Cardiovascular:      Pulses:           Dorsalis pedis pulses are 2+ on the right side and 2+ on the left side.        Posterior tibial pulses are 2+ on the right side and 2+ on the left side.      Comments: CRT < 3 sec to tips of toes.   Pulmonary:      Effort: No respiratory distress.   Musculoskeletal:      Right lower leg: Edema (foot, improved) present.      Left lower leg: No edema.      Comments: Mild to moderate recurrence of pain with palpation and ROM of 5th metatarsal R foot. Mild to moderate pain with ROM of 5th ray R foot. No palpable bony hypertrophy along R lateral foot. Mild pain with palpation of peroneus tertius and peroneus brevis tendons along distal lateral R ankle and onto its insertion on the 5th metatarsal shaft. " Otherwise rectus foot and toe position bilateral with no major deformities noted. Mild equinus noted b/l ankles with < 10 deg DF noted. MMT 5/5 in DF/PF/Inv/Ev resistance with no reproduction of pain in any direction. Passive range of motion of ankle and pedal joints is painless b/l.     Skin:     General: Skin is warm and dry.      Findings: No erythema.      Comments: No open lesions, lacerations or wounds noted. Nails are normal to R 1-5 and L 1-5. Interdigital spaces clean, dry and intact b/l. No erythema noted to b/l foot. Skin texture normal. Pedal hair normal. No ecchymosis or bruising noted to R foot. Skin temperature normal b/l foot.      Neurological:      Mental Status: She is alert and oriented to person, place, and time.      Sensory: No sensory deficit.      Comments: Light touch, proprioception, and sharp/dull sensation are all intact bilaterally.    Psychiatric:         Behavior: Behavior normal.         Thought Content: Thought content normal.         Judgment: Judgment normal.             Assessment:       Encounter Diagnoses   Name Primary?    Arthritis of foot, right Yes    Tenosynovitis of peroneus brevis tendon - Right Foot     Fracture of base of fifth metatarsal bone of right foot with routine healing, subsequent encounter                    Plan:        was seen today for results.    Diagnoses and all orders for this visit:    Arthritis of foot, right  -     Ambulatory referral/consult to Physical/Occupational Therapy; Future    Tenosynovitis of peroneus brevis tendon - Right Foot  -     Ambulatory referral/consult to Physical/Occupational Therapy; Future    Fracture of base of fifth metatarsal bone of right foot with routine healing, subsequent encounter  -     Ambulatory referral/consult to Physical/Occupational Therapy; Future    Other orders  -     dexAMETHasone injection 2 mg  -     triamcinolone acetonide injection 20 mg             I counseled the patient on her conditions,  their implications and medical management.    Xray and MRI reviewed. Continued pain for 7 months since original injury.     After sterilizing the area with an alcohol prep, the affected area was injected with a  solution containing 0.5 ml of 2% Lidocaine plain + 0.5ml 0.5% marcaine plain + 0.5ml Kenalog (20mg) + 0.5ml of Dexamethasone (2mg) injected into the right CC joint and STJ lateral R rearfoot.  The patient tolerated the injection well and reported comfort to the area immediately after injection. Advised that additional injections may be necessary in the future.     Will order PT for further assessment of R rearfoot pain, tenosynovitis. See referral.     Rx Voltaren gel to be applied to affected area up to 3-4 x daily as needed for pain. For R lateral ankle and foot pain. Continue.     Continue Rx tramadol 50mg q6h for pain.  Advised to take as directed only when pain is severe.     Dispensed and applied ankle brace to affected side for added support of the joint and to decrease motion and improve pain and stability. Advised to continue at all times while ambulating.     RTC 1 month, sooner PRN. Patient may require surgery at some point to repair fracture site if not fully healed and to fuse CC joint if needed in future. Patient verbalized understanding and will exhaust all conservative options above prior to consideration of surgery.

## 2023-04-19 ENCOUNTER — CLINICAL SUPPORT (OUTPATIENT)
Dept: REHABILITATION | Facility: HOSPITAL | Age: 53
End: 2023-04-19
Payer: OTHER MISCELLANEOUS

## 2023-04-19 DIAGNOSIS — M19.071 ARTHRITIS OF FOOT, RIGHT: ICD-10-CM

## 2023-04-19 DIAGNOSIS — S92.351D FRACTURE OF BASE OF FIFTH METATARSAL BONE OF RIGHT FOOT WITH ROUTINE HEALING, SUBSEQUENT ENCOUNTER: ICD-10-CM

## 2023-04-19 DIAGNOSIS — M65.9: ICD-10-CM

## 2023-04-19 PROCEDURE — 97110 THERAPEUTIC EXERCISES: CPT | Mod: PN

## 2023-04-19 PROCEDURE — 97162 PT EVAL MOD COMPLEX 30 MIN: CPT | Mod: PN

## 2023-05-03 ENCOUNTER — CLINICAL SUPPORT (OUTPATIENT)
Dept: REHABILITATION | Facility: HOSPITAL | Age: 53
End: 2023-05-03
Payer: OTHER MISCELLANEOUS

## 2023-05-03 DIAGNOSIS — M65.9: ICD-10-CM

## 2023-05-03 DIAGNOSIS — S92.351D FRACTURE OF BASE OF FIFTH METATARSAL BONE OF RIGHT FOOT WITH ROUTINE HEALING: ICD-10-CM

## 2023-05-03 DIAGNOSIS — M19.071 ARTHRITIS OF FOOT, RIGHT: Primary | ICD-10-CM

## 2023-05-03 PROCEDURE — 97140 MANUAL THERAPY 1/> REGIONS: CPT | Mod: PN

## 2023-05-03 PROCEDURE — 97110 THERAPEUTIC EXERCISES: CPT | Mod: PN

## 2023-05-03 NOTE — PLAN OF CARE
[facility]  Physical Therapy Initial Evaluation    Date: 4/19/2023   Name: Blanca Thomas  Clinic Number: [id number]    Therapy Diagnosis:   Encounter Diagnoses   Name Primary?    Arthritis of foot, right     Tenosynovitis of peroneus brevis tendon - Right Foot     Fracture of base of fifth metatarsal bone of right foot with routine healing, subsequent encounter      Physician: Jon Shaikh DPM    Physician Orders: PT Eval and Treat   Medical Diagnosis from Referral:   M19.071 (ICD-10-CM) - Arthritis of foot, right   M65.9 (ICD-10-CM) - Tenosynovitis of peroneus brevis tendon   S92.351D (ICD-10-CM) - Fracture of base of fifth metatarsal bone of right foot with routine healing, subsequent encounter   Evaluation Date: 4/19/2023  Authorization Period Expiration: 3/28/2024  Plan of Care Expiration: 6/19/2023  Visit # / Visits authorized: 1/1    Time In: 7:10 am  Time Out: 8:00 am  Total Appointment Time (timed & untimed codes): 50 minutes    Precautions: Standard and Diabetes; needs interpretor, presenting with daughter     Subjective   Date of onset: ~ 7 months   History of current condition -  reports: she's been having right foot pain for about 7 months now. Patient reporting that she has a fracture and to wear a brace for the time being. Aggravating factors include walking and pain after extended sitting. Easing factors are minimal but she uses a topical cream.     Medical History:   Past Medical History:   Diagnosis Date    Diabetes mellitus     Hyperlipemia     Hypertension        Surgical History:   Blanca Thomas  has no past surgical history on file.    Medications:    has a current medication list which includes the following prescription(s): atorvastatin, ciclopirox, ciclopirox, diclofenac, diclofenac, diclofenac sodium, empagliflozin, glipizide, glipizide, hydrocodone-acetaminophen, ibuprofen, jardiance, levemir flextouch u-100 insuln, lisinopril, lovastatin,  metformin, methocarbamol, methocarbamol, pen needle, diabetic, ranitidine, and trueplus pen needle.    Allergies:   Review of patient's allergies indicates:   Allergen Reactions    Lisinopril Anaphylaxis        Imaging:   Impression:  1. Incomplete healing of a 5th metatarsal base fracture.  2. Thickening and edema of the dorsal calcaneocuboid and calcaneocuboid component of the bifurcate ligaments, possibly related to a recent midtarsal sprain or degenerative in nature.  3. Prominent marrow edema at the anterolateral calcaneus and adjacent cuboid with associated chondral thinning, presumably degenerative in nature.  Reciprocating osseous contusions related to recent mid tarsal spurring can present with similar findings.  4. Peroneal tenosynovitis.    Prior Therapy: no  Social History: no stairs; lives with younger son   Occupation: ; on her feet a lot   Prior Level of Function: 100%  Current Level of Function: not using the stairs, not bending over    Pain:  Current 0/10, worst 8/10, best 0/10   Location: right foot  Description: sharp, aching  Aggravating Factors: see above   Easing Factors: see above     Pt's goals:   Patient would like to improve the function of her right foot in order to walk without a limp.    Objective      Gait: ambulating with limp/offload of the right lower extremity                 Range of Motion: AROM:  Hip Left Right   Ext Rotation  25 degrees  25 degrees   Int Rotation  20 degrees  20 degrees     Ankle Left Right   Dorsiflexion  5 degrees  2 degrees   Plantarflexion   55 degrees  40 degrees   Inversion  40 degrees  15 degrees   Eversion  20 degrees  15 degrees     Strength:  Hip Left Right   Supine flexion 5/5 5/5   Abduction 4+/5 4+/5   Extension 4+/5 4+/5     Ankle Left Right   Dorsiflexion 5/5 5/5   Plantarflexion 5/5 4/5   Inversion 5/5 4/5   Eversion 5/5 4/5     Joint Mobility: NT                Palpation: tender to palpation at base of 5th metatarsal      Sensation: WNL      Flexibility: gastroc/soleus tightness    Limitation/Restriction for FOTO Foot Survey    Therapist reviewed FOTO scores for Blanca Thomas on 4/19/2023.   FOTO documents entered into [employer] - see Media section.    Limitation Score: 39%  Predicted Limitation Score: 31%         TREATMENT   Treatment Time In: 7:45 am  Treatment Time Out: 8:00 am  Total Treatment time (time-based codes) separate from Evaluation: 15 minutes     received therapeutic exercises to develop strength, endurance, ROM, and flexibility for 15 minutes including:  Ankle pumps: 10x  Ankle circles: CW/CCW - 10x  Ankle plantarflexion: green theraband - 2x10      Home Exercises and Patient Education Provided    Education provided:   - HEP  - POC/prognosis     Written Home Exercises Provided: yes.  Exercises were reviewed and  was able to demonstrate them prior to the end of the session.   demonstrated good  understanding of the education provided.     See EMR under Patient Instructions for exercises provided 4/19/2023.    Assessment    is a 53 y.o. female referred to outpatient Physical Therapy with a medical diagnosis of right foot arthritis, tenosynovitis of peroneus brevis tendon, and a fracture of the base of 5th metatarsal with routine healing. Patient's signs and symptoms are consistent with medical diagnosis and pain level appears to be under control at this time. Patient is presenting with right ankle range of motion deficits in comparison to the left ankle and weakness in the right lower extremity, secondary to wearing a boot for an extended period of time following acute injury onset. Patient is now weight bearing as tolerated with ankle brace without restrictions and would benefit from skilled PT in order to regain right ankle range of motion, strength, and proprioception.     Pt to be seen 1x/week for 8 weeks     Pt prognosis is Good.   Pt will benefit from skilled outpatient Physical Therapy to address the  deficits stated above and in the chart below, provide pt/family education, and to maximize pt's level of independence.     Plan of care discussed with patient: Yes  Pt's spiritual, cultural and educational needs considered and patient is agreeable to the plan of care and goals as stated below:     Anticipated Barriers for therapy: language barrier     Medical Necessity is demonstrated by the following  History  Co-morbidities and personal factors that may impact the plan of care Co-morbidities:   diabetes and HTN    Personal Factors:   social background     high   Examination  Body Structures and Functions, activity limitations and participation restrictions that may impact the plan of care Body Regions:   lower extremities    Body Systems:    gross symmetry  ROM  strength  gross coordinated movement  balance  gait  transfers  transitions  motor control  motor learning    Participation Restrictions:   Walking     Activity limitations:   Learning and applying knowledge  no deficits    General Tasks and Commands  no deficits    Communication  no deficits    Mobility  lifting and carrying objects  walking    Self care  no deficits    Domestic Life  shopping  doing house work (cleaning house, washing dishes, laundry)  assisting others    Interactions/Relationships  no deficits    Life Areas  no deficits    Community and Social Life  no deficits         high   Clinical Presentation evolving clinical presentation with changing clinical characteristics moderate   Decision Making/ Complexity Score: moderate     Goals:  Short Term Goals: 3 weeks   1. Maintain compliance and understanding of HEP. - PROGRESSING, NOT MET  2. Decrease subjective pain rating from a 8/10 pain with standing/walking to a 2/10 pain with standing/walking. - PROGRESSING, NOT MET  3. Increase bilateral hip strength from a 4/5 to a 5/5 with manual muscle testing to offload plantar fascia. - PROGRESSING, NOT MET        Long Term Goals: 6 weeks   1.  Decrease FOTO limitation score from 39% limitation to </=31% limitation for better functional outcomes. - PROGRESSING, NOT MET  2. Increase ankle dorsiflexion and plantar flexion active range of motion to within normal limits in order to offload plantar fascia. - PROGRESSING, NOT MET  3. Patient will be able to walk 1 mile without reproduction of (R) heel pain. - PROGRESSING, NOT MET  4. Decrease subjective pain rating from a 8/10 pain with standing/walking to a 0/10 pain with standing/walking. - PROGRESSING, NOT MET    Plan   Plan of care Certification: 4/19/2023 to 6/19/2023.    Outpatient Physical Therapy 1 times weekly for 8 weeks to include the following interventions: Gait Training, Manual Therapy, Moist Heat/ Ice, Neuromuscular Re-ed, Patient Education, Self Care, Therapeutic Activities, and Therapeutic Exercise.     Blaise Adler, PT

## 2023-05-03 NOTE — PROGRESS NOTES
"[facility]   Physical Therapy Treatment Note      Name: Blanca Gudino [place]  Clinic Number: [id number]    Therapy Diagnosis:   Encounter Diagnoses   Name Primary?    Arthritis of foot, right Yes    Fracture of base of fifth metatarsal bone of right foot with routine healing     Tenosynovitis of peroneus brevis tendon      Physician: Jon Shaikh DPM    Visit Date: 5/3/2023    Physician Orders: PT Eval and Treat   Medical Diagnosis from Referral:   M19.071 (ICD-10-CM) - Arthritis of foot, right   M65.9 (ICD-10-CM) - Tenosynovitis of peroneus brevis tendon   S92.351D (ICD-10-CM) - Fracture of base of fifth metatarsal bone of right foot with routine healing, subsequent encounter   Evaluation Date: 4/19/2023  Authorization Period Expiration: 3/28/2024  Plan of Care Expiration: 6/19/2023  Visit # / Visits authorized: 1/1 (+ eval)    PTA Visit #: 0/5     Time In: 5:05 pm  Time Out: 6:00 pm   Total Billable Time: 55 minutes    Subjective     Pt reports: experiencing less pain with activities. But most pain when she sits down for a long time and then goes to stand up. It lasts for 3-5 minutes.    She was compliant with home exercise program.  Response to previous treatment: ongoing   Functional change: ongonig     Pain: 0/10 at rest; 6/10 when rising from chair   Location: right base of 5th metatarsal    Objective      Objective Measures updated at progress report unless specified.     Treatment      received the treatments listed below:      therapeutic exercises to develop strength, ROM, and flexibility for 45 minutes including:  Ankle circles CW/CCW: 15x each  Ankle ABCs: 1x   4-way ankle: green theraband - 2x10   Arch domes: 15x5"  Toe Yoga: 1 minute   Towel scrunches: 1 minute   Seated plantar flexion: 20# 3x10  Gastroc fitter stretch: 3x30" right   Soleus fitter stretch: 3x30" right   Double heel raises: 3x10  single leg balance: 3x30" right  Lateral band walks: green theraband - 3 " laps 25 feet       manual therapy techniques: Joint mobilizations were applied to the: right ankle for 15 minutes, including:  A/P talocrural mobilizations - grade III-IV  Subtalar mobilizations - grade III-IV        Patient Education and Home Exercises       Education provided:   - - Findings; prognosis and plan of care  - Home exercise program  - Modality options  - Therapist contact information      Written Home Exercises Provided: yes. Exercises were reviewed and  was able to demonstrate them prior to the end of the session.   demonstrated good  understanding of the education provided. See EMR under Patient Instructions for exercises provided during therapy sessions    Assessment      is a 53 y.o. female referred to outpatient Physical Therapy with a medical diagnosis of right foot arthritis, tenosynovitis of peroneus brevis tendon, and a fracture of the base of 5th metatarsal with routine healing. Presents with daughter for interpretation. Biggest complaints of pain following extended periods of sitting when she goes to stand up. Last for 3-5 minutes. No issues with physical activities. Good tolerance to right ankle range of motion, strength, and proprioception exercises. Will monitor response to first follow up.     Is progressing well towards her goals.   Pt prognosis is Good.     Pt will continue to benefit from skilled outpatient physical therapy to address the deficits listed in the problem list box on initial evaluation, provide pt/family education and to maximize pt's level of independence in the home and community environment.     Pt's spiritual, cultural and educational needs considered and pt agreeable to plan of care and goals.     Anticipated barriers to physical therapy: language barrier     Goals:   Short Term Goals: 3 weeks   1. Maintain compliance and understanding of HEP. - PROGRESSING, NOT MET  2. Decrease subjective pain rating from a 8/10 pain with standing/walking to  a 2/10 pain with standing/walking. - PROGRESSING, NOT MET  3. Increase bilateral hip strength from a 4/5 to a 5/5 with manual muscle testing to offload plantar fascia. - PROGRESSING, NOT MET        Long Term Goals: 6 weeks   1. Decrease FOTO limitation score from 39% limitation to </=31% limitation for better functional outcomes. - PROGRESSING, NOT MET  2. Increase ankle dorsiflexion and plantar flexion active range of motion to within normal limits in order to offload plantar fascia. - PROGRESSING, NOT MET  3. Patient will be able to walk 1 mile without reproduction of (R) heel pain. - PROGRESSING, NOT MET  4. Decrease subjective pain rating from a 8/10 pain with standing/walking to a 0/10 pain with standing/walking. - PROGRESSING, NOT MET    Plan     ankle range of motion   ankle stabilization  balance/proprioception    Callie Kenna, PT

## 2023-05-08 ENCOUNTER — CLINICAL SUPPORT (OUTPATIENT)
Dept: REHABILITATION | Facility: HOSPITAL | Age: 53
End: 2023-05-08
Payer: OTHER MISCELLANEOUS

## 2023-05-08 DIAGNOSIS — M65.9: ICD-10-CM

## 2023-05-08 DIAGNOSIS — S92.351D FRACTURE OF BASE OF FIFTH METATARSAL BONE OF RIGHT FOOT WITH ROUTINE HEALING: ICD-10-CM

## 2023-05-08 DIAGNOSIS — M19.071 ARTHRITIS OF FOOT, RIGHT: Primary | ICD-10-CM

## 2023-05-08 PROCEDURE — 97110 THERAPEUTIC EXERCISES: CPT | Mod: PN

## 2023-05-08 NOTE — PROGRESS NOTES
[facility]   Physical Therapy Treatment Note      Name: Blanca Gudino [place]  Clinic Number: [id number]    Therapy Diagnosis:   Encounter Diagnoses   Name Primary?    Arthritis of foot, right Yes    Fracture of base of fifth metatarsal bone of right foot with routine healing     Tenosynovitis of peroneus brevis tendon      Physician: Jon Shaikh DPM    Visit Date: 5/8/2023    Physician Orders: PT Eval and Treat   Medical Diagnosis from Referral:   M19.071 (ICD-10-CM) - Arthritis of foot, right   M65.9 (ICD-10-CM) - Tenosynovitis of peroneus brevis tendon   S92.351D (ICD-10-CM) - Fracture of base of fifth metatarsal bone of right foot with routine healing, subsequent encounter   Evaluation Date: 4/19/2023  Authorization Period Expiration: 3/28/2024  Plan of Care Expiration: 6/19/2023  Visit # / Visits authorized: 2/8 (+ eval)  FOTO: 2/5    PTA Visit #: 0/5     Time In: 5:00 pm  Time Out: 5:30 pm (Patient requested to leave early)  Total Billable Time: 30 minutes    Subjective     Pt reports: Continuing pain described as a shooting pain in lateral aspect of foot when transitioning from resting position to standing. Last for 3-5 minutes and then resolves. No issues with physical activity.    She was compliant with home exercise program.  Response to previous treatment: ongoing   Functional change: ongonig     Pain: 0/10 at rest; 5/10 when rising from chair   Location: right base of 5th metatarsal    Objective      Objective Measures updated at progress report unless specified.     Treatment      received the treatments listed below:      therapeutic exercises to develop strength, ROM, and flexibility for 30 minutes including:  Recumbent bike: 6 minutes - level 5 for ankle mobility and endurance   4-way ankle: green theraband - 3x10 right   Seated figure 4 eccentric posterior tibialis - green theraband 3x10   Single leg heel raises: 3x10 bilateral  Seated plantar flexion: 35# 3x10 right  "  Standing arch domes: 15x5" bilaterally   Toe Yoga: 1 minute   Lateral band walks: green theraband - 3 laps 25 feet around toes  Sciatic nerve glides: 20x - 2 rounds   NEXT: tibial nerve glides     OOT:  Gastroc fitter stretch: 3x30" right   Soleus fitter stretch: 3x30" right   Double heel raises: 3x10  Single leg balance: 3x30" on airex - right    manual therapy techniques: Joint mobilizations were applied to the: right ankle for 00 minutes, including:  A/P talocrural mobilizations - grade III-IV  Subtalar mobilizations - grade III-IV        Patient Education and Home Exercises       Education provided:   - - Findings; prognosis and plan of care  - Home exercise program  - Modality options  - Therapist contact information      Written Home Exercises Provided: yes. Exercises were reviewed and  was able to demonstrate them prior to the end of the session.   demonstrated good  understanding of the education provided. See EMR under Patient Instructions for exercises provided during therapy sessions    Assessment      is a 53 y.o. female referred to outpatient Physical Therapy with a medical diagnosis of right foot arthritis, tenosynovitis of peroneus brevis tendon, and a fracture of the base of 5th metatarsal with routine healing. Presents with daughter for interpretation. Treatment session limited to patient requesting to leave early. Added sciatic nerve glides to see if neural tension is causing shooting pain in lateral aspect of foot. Added to home exercise program. Other focus on gastroc-soleus strengthening, foot intrinsic activation/control, ankle stabilization, and posterior tibialis strengthening.     Is progressing well towards her goals.   Pt prognosis is Good.     Pt will continue to benefit from skilled outpatient physical therapy to address the deficits listed in the problem list box on initial evaluation, provide pt/family education and to maximize pt's level of independence in " the home and community environment.     Pt's spiritual, cultural and educational needs considered and pt agreeable to plan of care and goals.     Anticipated barriers to physical therapy: language barrier     Goals:   Short Term Goals: 3 weeks   1. Maintain compliance and understanding of HEP. - PROGRESSING, NOT MET  2. Decrease subjective pain rating from a 8/10 pain with standing/walking to a 2/10 pain with standing/walking. - PROGRESSING, NOT MET  3. Increase bilateral hip strength from a 4/5 to a 5/5 with manual muscle testing to offload plantar fascia. - PROGRESSING, NOT MET        Long Term Goals: 6 weeks   1. Decrease FOTO limitation score from 39% limitation to </=31% limitation for better functional outcomes. - PROGRESSING, NOT MET  2. Increase ankle dorsiflexion and plantar flexion active range of motion to within normal limits in order to offload plantar fascia. - PROGRESSING, NOT MET  3. Patient will be able to walk 1 mile without reproduction of (R) heel pain. - PROGRESSING, NOT MET  4. Decrease subjective pain rating from a 8/10 pain with standing/walking to a 0/10 pain with standing/walking. - PROGRESSING, NOT MET    Plan     ankle range of motion   ankle stabilization  balance/proprioception    Callie Kenna, PT

## 2023-05-09 ENCOUNTER — OFFICE VISIT (OUTPATIENT)
Dept: PODIATRY | Facility: CLINIC | Age: 53
End: 2023-05-09
Payer: OTHER MISCELLANEOUS

## 2023-05-09 VITALS
SYSTOLIC BLOOD PRESSURE: 135 MMHG | HEIGHT: 65 IN | WEIGHT: 198.19 LBS | BODY MASS INDEX: 33.02 KG/M2 | HEART RATE: 69 BPM | DIASTOLIC BLOOD PRESSURE: 82 MMHG

## 2023-05-09 DIAGNOSIS — S92.901K CLOSED FRACTURE OF RIGHT FOOT WITH NONUNION, SUBSEQUENT ENCOUNTER: Primary | ICD-10-CM

## 2023-05-09 DIAGNOSIS — M19.071 ARTHRITIS OF FOOT, RIGHT: ICD-10-CM

## 2023-05-09 PROCEDURE — 99213 PR OFFICE/OUTPT VISIT, EST, LEVL III, 20-29 MIN: ICD-10-PCS | Mod: S$GLB,,, | Performed by: PODIATRIST

## 2023-05-09 PROCEDURE — 99999 PR PBB SHADOW E&M-EST. PATIENT-LVL III: CPT | Mod: PBBFAC,,, | Performed by: PODIATRIST

## 2023-05-09 PROCEDURE — 99999 PR PBB SHADOW E&M-EST. PATIENT-LVL III: ICD-10-PCS | Mod: PBBFAC,,, | Performed by: PODIATRIST

## 2023-05-09 PROCEDURE — 99213 OFFICE O/P EST LOW 20 MIN: CPT | Mod: S$GLB,,, | Performed by: PODIATRIST

## 2023-05-09 PROCEDURE — 99213 OFFICE O/P EST LOW 20 MIN: CPT | Mod: PBBFAC | Performed by: PODIATRIST

## 2023-05-09 NOTE — PROGRESS NOTES
Subjective:      Patient ID: Blanca Thomas is a 53 y.o. female.    Chief Complaint: Foot Pain (Pain isn't there when active; only presents when sitting to standing)       is a 53 y.o. female who presents to the podiatry clinic  with complaint of  right foot pain. Onset of the symptoms was about a month ago. 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 Current symptoms include:  pain and swelling R foot . Aggravating factors: any weight bearing. Symptoms have progressed to a point and plateaued. Patient has had no prior foot problems. Evaluation to date: plain films: abnormal fracture base of R 5th metatrsal, non displaced, non angulated/nondeviated . Treatment to date:  went to ER where they obtained xrays, provided a short CAM boot and crutches. States she has been off the foot for the past 3-4 weeks . Patients rates pain 6/10 on pain scale.    10/07/2022: follow up for R foot 5th met base fracture. Has been keeping off foot with wheelchair and crutches. No pain. Had xray done which showed healing fracture of 5th met base.  No other complaints at this time.     10/27/2022: follow up for R 5th met base fracture. Using CAM boot and crutches as directed and has 0/10 pain. Doing well. Here for further recommendations.     11/23/2022: follow up for R 5th met base fracture. Doing well. Using Darco shoe as directed. Hurts only on occasion but most of the time she is pain free. Still swelling a little. No new concerns.     12/27/2022: follow up for right 5th met base fracture. Pain improved in area of fracture but having some pain at the level of ankle behind the fracture. Would like to get assessed for recommendations.     03/23/2023: follow up for R 5th met base fracture. States since she got out of the darco shoe and started working again, her foot started to hurt and become swollen and red again in the area of her fracture. Still  "hurts at about 8/10 and is inquiring about additional assessment and recommendations. Has been problematic now for 7 months. Most recent xray from 3/3/23 shows faint fracture line with possible non union/delayed union.     03/29/2023: Follow up for R foot pain and injury. Due to continued pain, patient had MRI done which shows incompletely healed fracture of 5th met base and CC joint arthritis as well as tenosynovitis of PB tendon. She is here for further recommendations.      05/09/2023: follow up for R foot pain and injury. Previous steroid injection did not help at all with the amount of pain but now it does take longer being on her feet for pain to start. Overall walking but not able to do certain movements like squatting or extending walking/standing without pain. Pain is still 6/10. Has had 2 sessions of PT which have helped a little.     Vitals:    05/09/23 1410   BP: 135/82   Pulse: 69   Weight: 89.9 kg (198 lb 3.1 oz)   Height: 5' 5" (1.651 m)   PainSc:   6   PainLoc: Foot     Review of Systems   Constitutional: Negative for chills and fever.   Cardiovascular:  Negative for chest pain, claudication and leg swelling.   Respiratory:  Negative for cough and shortness of breath.    Skin:  Negative for dry skin and nail changes.   Musculoskeletal:         R foot pain recurrence    Gastrointestinal:  Negative for nausea and vomiting.   Neurological:  Negative for numbness and paresthesias.   Psychiatric/Behavioral:  Negative for altered mental status.          Objective:      Physical Exam  Vitals reviewed.   Constitutional:       Appearance: She is well-developed.   HENT:      Head: Normocephalic.   Cardiovascular:      Pulses:           Dorsalis pedis pulses are 2+ on the right side and 2+ on the left side.        Posterior tibial pulses are 2+ on the right side and 2+ on the left side.      Comments: CRT < 3 sec to tips of toes.   Pulmonary:      Effort: No respiratory distress.   Musculoskeletal:      Right " lower leg: Edema (foot, improved) present.      Left lower leg: No edema.      Comments: Mild pain with palpation and ROM of 5th metatarsal R foot. Mild to moderate pain with ROM of 5th ray R foot. No palpable bony hypertrophy along R lateral foot. Mild pain with palpation of peroneus tertius and peroneus brevis tendons along distal lateral R ankle and onto its insertion on the 5th metatarsal shaft. Otherwise rectus foot and toe position bilateral with no major deformities noted. Mild equinus noted b/l ankles with < 10 deg DF noted. MMT 5/5 in DF/PF/Inv/Ev resistance with no reproduction of pain in any direction. Passive range of motion of ankle and pedal joints is painless b/l.     Skin:     General: Skin is warm and dry.      Findings: No erythema.      Comments: No open lesions, lacerations or wounds noted. Nails are normal to R 1-5 and L 1-5. Interdigital spaces clean, dry and intact b/l. No erythema noted to b/l foot. Skin texture normal. Pedal hair normal. No ecchymosis or bruising noted to R foot. Skin temperature normal b/l foot.      Neurological:      Mental Status: She is alert and oriented to person, place, and time.      Sensory: No sensory deficit.      Comments: Light touch, proprioception, and sharp/dull sensation are all intact bilaterally.    Psychiatric:         Behavior: Behavior normal.         Thought Content: Thought content normal.         Judgment: Judgment normal.             Assessment:       Encounter Diagnoses   Name Primary?    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      Xray and MRI reviewed. Continued pain for 8 months since original injury.      Continue PT as ordered once a week.     Rx Voltaren gel to be applied to affected area up to 3-4 x daily as needed for pain. For R lateral ankle and foot pain. Continue.     Pain not severe enough for medication at this time.     Continue ankle brace for added support.     Exogen Bone stimulator ordered. No insurance. Cash pay.     RTC 6 weeks, sooner PRN. Patient may require surgery at some point to repair fracture site if not fully healed and to fuse CC joint if needed in future. Patient verbalized understanding and will exhaust all conservative options above prior to consideration of surgery. Recommend bone stimulator for 2-4 months with likely repeat MRI.

## 2023-05-15 ENCOUNTER — CLINICAL SUPPORT (OUTPATIENT)
Dept: REHABILITATION | Facility: HOSPITAL | Age: 53
End: 2023-05-15
Payer: OTHER MISCELLANEOUS

## 2023-05-15 DIAGNOSIS — M65.9: ICD-10-CM

## 2023-05-15 DIAGNOSIS — M19.071 ARTHRITIS OF FOOT, RIGHT: Primary | ICD-10-CM

## 2023-05-15 DIAGNOSIS — S92.351D FRACTURE OF BASE OF FIFTH METATARSAL BONE OF RIGHT FOOT WITH ROUTINE HEALING: ICD-10-CM

## 2023-05-15 PROCEDURE — 97530 THERAPEUTIC ACTIVITIES: CPT | Mod: PN

## 2023-05-15 NOTE — PROGRESS NOTES
[facility]   Physical Therapy Treatment Note / Discharge Note      Name: Blanca Gudino William  Clinic Number: [id number]    Therapy Diagnosis:   Encounter Diagnoses   Name Primary?    Arthritis of foot, right Yes    Fracture of base of fifth metatarsal bone of right foot with routine healing     Tenosynovitis of peroneus brevis tendon        Physician: Jon Shaikh DPM    Visit Date: 5/15/2023    Physician Orders: PT Eval and Treat   Medical Diagnosis from Referral:   M19.071 (ICD-10-CM) - Arthritis of foot, right   M65.9 (ICD-10-CM) - Tenosynovitis of peroneus brevis tendon   S92.351D (ICD-10-CM) - Fracture of base of fifth metatarsal bone of right foot with routine healing, subsequent encounter   Evaluation Date: 4/19/2023  Authorization Period Expiration: 3/28/2024  Plan of Care Expiration: 6/19/2023  Visit # / Visits authorized: 3/8 (+ eval)  FOTO: 2/5    PTA Visit #: 0/5     Time In: 5:00 pm  Time Out: 5:20 pm   Total Billable Time: 20 minutes (1 TA)    Subjective     Pt reports: continued foot pain with ambulating. Especially upon transitioning from sitting to standing position. Patient agreeable to discharging with a home exercise program today.    She was compliant with home exercise program.  Response to previous treatment: ongoing   Functional change: ongonig     Pain: 0/10 at rest; 5/10 when rising from chair   Location: right base of 5th metatarsal    Objective    (5/15/2023)  Range of Motion: AROM:  Ankle Left Right   Dorsiflexion  5 degrees  4 degrees   Plantarflexion   55 degrees  50 degrees   Inversion  40 degrees  23 degrees   Eversion  20 degrees  20 degrees        Treatment      received the treatments listed below:      Therapeutic activities to improve functional performance for 20 minutes, including:  FOTO  Objective tests and measures  Outcome measures  Home exercise program overview   Questions and answers   Discharge           Patient Education and Home  Exercises       Education provided:   - - Findings; prognosis and plan of care  - Home exercise program  - Modality options  - Therapist contact information      Written Home Exercises Provided: yes. Exercises were reviewed and  was able to demonstrate them prior to the end of the session.   demonstrated good  understanding of the education provided. See EMR under Patient Instructions for exercises provided during therapy sessions    Assessment      is a 53 y.o. female referred to outpatient Physical Therapy with a medical diagnosis of right foot arthritis, tenosynovitis of peroneus brevis tendon, and a fracture of the base of 5th metatarsal with routine healing. Patient has made some progress in physical therapy; although, still having right foot pain with transitions from sitting to standing and increased weight bearing. Provided patient with education for outcomes, prognosis, and home exercise program. Patient paying out of pocket and was agreeable to discharging with a home exercise program. Patient has been discharged from physical therapy.     Is progressing well towards her goals.   Pt prognosis is Good.     Pt will continue to benefit from skilled outpatient physical therapy to address the deficits listed in the problem list box on initial evaluation, provide pt/family education and to maximize pt's level of independence in the home and community environment.     Pt's spiritual, cultural and educational needs considered and pt agreeable to plan of care and goals.     Anticipated barriers to physical therapy: language barrier     Goals:   Short Term Goals: 3 weeks   1. Maintain compliance and understanding of HEP. - MET  2. Decrease subjective pain rating from a 8/10 pain with standing/walking to a 2/10 pain with standing/walking. - MET  3. Increase bilateral hip strength from a 4/5 to a 5/5 with manual muscle testing to offload plantar fascia. - PROGRESSING, NOT MET        Long Term  Goals: 6 weeks   1. Decrease FOTO limitation score from 39% limitation to </=31% limitation for better functional outcomes. - PROGRESSING, NOT MET  2. Increase ankle dorsiflexion and plantar flexion active range of motion to within normal limits in order to offload plantar fascia. - PROGRESSING, NOT MET  3. Patient will be able to walk 1 mile without reproduction of (R) heel pain. - PROGRESSING, NOT MET  4. Decrease subjective pain rating from a 8/10 pain with standing/walking to a 0/10 pain with standing/walking. - PROGRESSING, NOT MET    Plan   Discharged     Blaise Adler, PT

## 2023-06-27 ENCOUNTER — OFFICE VISIT (OUTPATIENT)
Dept: PODIATRY | Facility: CLINIC | Age: 53
End: 2023-06-27

## 2023-06-27 VITALS
BODY MASS INDEX: 32.99 KG/M2 | HEART RATE: 81 BPM | WEIGHT: 198 LBS | DIASTOLIC BLOOD PRESSURE: 76 MMHG | HEIGHT: 65 IN | SYSTOLIC BLOOD PRESSURE: 135 MMHG

## 2023-06-27 DIAGNOSIS — M19.071 ARTHRITIS OF FOOT, RIGHT: ICD-10-CM

## 2023-06-27 DIAGNOSIS — S92.901K CLOSED FRACTURE OF RIGHT FOOT WITH NONUNION, SUBSEQUENT ENCOUNTER: Primary | ICD-10-CM

## 2023-06-27 DIAGNOSIS — M79.671 FOOT PAIN, RIGHT: ICD-10-CM

## 2023-06-27 PROCEDURE — 99215 OFFICE O/P EST HI 40 MIN: CPT | Mod: PBBFAC | Performed by: PODIATRIST

## 2023-06-27 PROCEDURE — 99999 PR PBB SHADOW E&M-EST. PATIENT-LVL V: CPT | Mod: PBBFAC,,, | Performed by: PODIATRIST

## 2023-06-27 PROCEDURE — 99212 PR OFFICE/OUTPT VISIT, EST, LEVL II, 10-19 MIN: ICD-10-PCS | Mod: S$PBB,,, | Performed by: PODIATRIST

## 2023-06-27 PROCEDURE — 99999 PR PBB SHADOW E&M-EST. PATIENT-LVL V: ICD-10-PCS | Mod: PBBFAC,,, | Performed by: PODIATRIST

## 2023-06-27 PROCEDURE — 99212 OFFICE O/P EST SF 10 MIN: CPT | Mod: S$PBB,,, | Performed by: PODIATRIST

## 2023-06-27 NOTE — PROGRESS NOTES
Subjective:      Patient ID: Blanca Thomas is a 53 y.o. female.    Chief Complaint: Foot Pain (Pain is getting better, but is still there when going from sitting to standing)       is a 53 y.o. female who presents to the podiatry clinic  with complaint of  right foot pain. Onset of the symptoms was about a month ago. 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 Current symptoms include:  pain and swelling R foot . Aggravating factors: any weight bearing. Symptoms have progressed to a point and plateaued. Patient has had no prior foot problems. Evaluation to date: plain films: abnormal fracture base of R 5th metatrsal, non displaced, non angulated/nondeviated . Treatment to date:  went to ER where they obtained xrays, provided a short CAM boot and crutches. States she has been off the foot for the past 3-4 weeks . Patients rates pain 6/10 on pain scale.    10/07/2022: follow up for R foot 5th met base fracture. Has been keeping off foot with wheelchair and crutches. No pain. Had xray done which showed healing fracture of 5th met base.  No other complaints at this time.     10/27/2022: follow up for R 5th met base fracture. Using CAM boot and crutches as directed and has 0/10 pain. Doing well. Here for further recommendations.     11/23/2022: follow up for R 5th met base fracture. Doing well. Using Darco shoe as directed. Hurts only on occasion but most of the time she is pain free. Still swelling a little. No new concerns.     12/27/2022: follow up for right 5th met base fracture. Pain improved in area of fracture but having some pain at the level of ankle behind the fracture. Would like to get assessed for recommendations.     03/23/2023: follow up for R 5th met base fracture. States since she got out of the darco shoe and started working again, her foot started to hurt and become swollen and red again in the area of her  fracture. Still hurts at about 8/10 and is inquiring about additional assessment and recommendations. Has been problematic now for 7 months. Most recent xray from 3/3/23 shows faint fracture line with possible non union/delayed union.     03/29/2023: Follow up for R foot pain and injury. Due to continued pain, patient had MRI done which shows incompletely healed fracture of 5th met base and CC joint arthritis as well as tenosynovitis of PB tendon. She is here for further recommendations.      05/09/2023: follow up for R foot pain and injury. Previous steroid injection did not help at all with the amount of pain but now it does take longer being on her feet for pain to start. Overall walking but not able to do certain movements like squatting or extending walking/standing without pain. Pain is still 6/10. Has had 2 sessions of PT which have helped a little.     06/27/2023: follow up for R foot pain and injury. Area of previous fracture doing better but having pain and small lump along the outside of the ankle. Pain is 4/10 overall and she is wearing Ankle Brace with darco shoe. Here for further recommendations. Was not able to get bone stimulator yet.     Vitals:    06/27/23 1319   PainSc:   4   PainLoc: Foot     Review of Systems   Constitutional: Negative for chills and fever.   Cardiovascular:  Negative for chest pain, claudication and leg swelling.   Respiratory:  Negative for cough and shortness of breath.    Skin:  Negative for dry skin and nail changes.   Musculoskeletal:         R foot pain recurrence    Gastrointestinal:  Negative for nausea and vomiting.   Neurological:  Negative for numbness and paresthesias.   Psychiatric/Behavioral:  Negative for altered mental status.          Objective:      Physical Exam  Vitals reviewed.   Constitutional:       Appearance: She is well-developed.   HENT:      Head: Normocephalic.   Cardiovascular:      Pulses:           Dorsalis pedis pulses are 2+ on the right side  and 2+ on the left side.        Posterior tibial pulses are 2+ on the right side and 2+ on the left side.      Comments: CRT < 3 sec to tips of toes.   Pulmonary:      Effort: No respiratory distress.   Musculoskeletal:      Right lower leg: Edema (foot, improved) present.      Left lower leg: No edema.      Comments: No further pain with palpation and ROM of 5th metatarsal R foot. Minimal to mild pain with ROM of 5th ray R foot. Small 1-2 cm palpable bony hypertrophy along R lateral anterior ankle/hindfoot. Mild pain with palpation of peroneus tertius and peroneus brevis tendons along distal lateral R ankle and onto its insertion on the 5th metatarsal shaft. Otherwise rectus foot and toe position bilateral with no major deformities noted. Mild equinus noted b/l ankles with < 10 deg DF noted. MMT 5/5 in DF/PF/Inv/Ev resistance with no reproduction of pain in any direction. Passive range of motion of ankle and pedal joints is painless b/l.     Skin:     General: Skin is warm and dry.      Findings: No erythema.      Comments: No open lesions, lacerations or wounds noted. Nails are normal to R 1-5 and L 1-5. Interdigital spaces clean, dry and intact b/l. No erythema noted to b/l foot. Skin texture normal. Pedal hair normal. No ecchymosis or bruising noted to R foot. Skin temperature normal b/l foot.      Neurological:      Mental Status: She is alert and oriented to person, place, and time.      Sensory: No sensory deficit.      Comments: Light touch, proprioception, and sharp/dull sensation are all intact bilaterally.    Psychiatric:         Behavior: Behavior normal.         Thought Content: Thought content normal.         Judgment: Judgment normal.             Assessment:       Encounter Diagnoses   Name Primary?    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    Continued pain but improved.     Follow up MRI ordered of R hindfoot to assess soft tissues for injury and/or damage as well as healing of previous injury. Rule out soft tissue abnormality.      Continue HEP per PT as recommended.      Rx Voltaren gel to be applied to affected area up to 3-4 x daily as needed for pain. For R lateral ankle and foot pain. Continue.     Pain not severe enough for medication at this time.     Continue ankle brace for added support.     D/c Darco shoe.     RTC within 1 week after MRI, sooner PRN. Patient may require surgery at some point to repair fracture site if not fully healed and to fuse CC joint if needed in future. Patient verbalized understanding and will exhaust all conservative options above prior to consideration of surgery.

## 2023-06-27 NOTE — LETTER
June 27, 2023      [id number]  [address]  [facility] [address]  Phone: [phone]       Patient: Blanca Thomas   YOB: 1970  Date of Visit: 06/27/2023    To Whom It May Concern:    Evon Tohmas  was at [facility] on 06/27/2023. The patient is undergoing current treatment for a right foot problem. At this time, she may continue to work and should be allowed to wear ankle brace during working hours. If you have any questions or concerns, or if I can be of further assistance, please do not hesitate to contact me.    Sincerely,    Jon Shaikh DPM

## 2023-07-03 PROBLEM — M19.071 ARTHRITIS OF FOOT, RIGHT: Status: RESOLVED | Noted: 2023-05-03 | Resolved: 2023-07-03

## 2023-07-03 PROBLEM — M65.9 TENOSYNOVITIS OF PERONEUS BREVIS TENDON: Status: RESOLVED | Noted: 2023-05-03 | Resolved: 2023-07-03

## 2023-07-03 PROBLEM — S92.351D: Status: RESOLVED | Noted: 2023-05-03 | Resolved: 2023-07-03

## 2023-07-12 ENCOUNTER — HOSPITAL ENCOUNTER (OUTPATIENT)
Dept: RADIOLOGY | Facility: HOSPITAL | Age: 53
Discharge: HOME | End: 2023-07-12
Attending: PODIATRIST

## 2023-07-12 DIAGNOSIS — M79.671 FOOT PAIN, RIGHT: ICD-10-CM

## 2023-07-12 DIAGNOSIS — M19.071 ARTHRITIS OF FOOT, RIGHT: ICD-10-CM

## 2023-07-12 DIAGNOSIS — S92.901K CLOSED FRACTURE OF RIGHT FOOT WITH NONUNION, SUBSEQUENT ENCOUNTER: ICD-10-CM

## 2023-07-12 PROCEDURE — 73718 MRI LOWER EXTREMITY W/O DYE: CPT | Mod: TC,RT

## 2023-07-12 PROCEDURE — 73718 MRI LOWER EXTREMITY W/O DYE: CPT | Mod: 26,RT,, | Performed by: RADIOLOGY

## 2023-07-12 PROCEDURE — 73718 MRI FOOT (HINDFOOT) RIGHT WITHOUT CONTRAST: ICD-10-PCS | Mod: 26,RT,, | Performed by: RADIOLOGY

## 2023-07-13 ENCOUNTER — TELEPHONE (OUTPATIENT)
Dept: PODIATRY | Facility: CLINIC | Age: 53
End: 2023-07-13
Payer: OTHER MISCELLANEOUS

## 2023-07-13 NOTE — TELEPHONE ENCOUNTER
Called pt and informed her about her MRI results. She confirmed that she will follow up as scheduled. She will inform the office if she need to be seen earlier due to increased pain.

## 2023-07-21 ENCOUNTER — TELEPHONE (OUTPATIENT)
Dept: PODIATRY | Facility: CLINIC | Age: 53
End: 2023-07-21
Payer: OTHER MISCELLANEOUS

## 2023-07-21 NOTE — TELEPHONE ENCOUNTER
Left voice message for patient to give our office a call back at [phone]. Help with scheduling sooner appointment     4

## 2023-09-05 ENCOUNTER — OFFICE VISIT (OUTPATIENT)
Dept: PODIATRY | Facility: CLINIC | Age: 53
End: 2023-09-05

## 2023-09-05 VITALS
DIASTOLIC BLOOD PRESSURE: 77 MMHG | WEIGHT: 194.69 LBS | HEART RATE: 73 BPM | BODY MASS INDEX: 32.44 KG/M2 | HEIGHT: 65 IN | SYSTOLIC BLOOD PRESSURE: 134 MMHG

## 2023-09-05 DIAGNOSIS — M19.079 OSTEOARTHRITIS OF CALCANEOCUBOID JOINT: Primary | ICD-10-CM

## 2023-09-05 PROCEDURE — 99213 PR OFFICE/OUTPT VISIT, EST, LEVL III, 20-29 MIN: ICD-10-PCS | Mod: S$PBB,,, | Performed by: PODIATRIST

## 2023-09-05 PROCEDURE — 99999 PR PBB SHADOW E&M-EST. PATIENT-LVL IV: ICD-10-PCS | Mod: PBBFAC,,, | Performed by: PODIATRIST

## 2023-09-05 PROCEDURE — 99213 OFFICE O/P EST LOW 20 MIN: CPT | Mod: S$PBB,,, | Performed by: PODIATRIST

## 2023-09-05 PROCEDURE — 99999 PR PBB SHADOW E&M-EST. PATIENT-LVL IV: CPT | Mod: PBBFAC,,, | Performed by: PODIATRIST

## 2023-09-05 PROCEDURE — 99214 OFFICE O/P EST MOD 30 MIN: CPT | Mod: PBBFAC | Performed by: PODIATRIST

## 2023-09-05 NOTE — PROGRESS NOTES
Subjective:      Patient ID: Blanca Thomas is a 53 y.o. female.    Chief Complaint: Follow-up and Foot Pain (Right foot pressure pain along lateral exterior midfoot )       is a 53 y.o. female who presents to the podiatry clinic  with complaint of  right foot pain. Onset of the symptoms was about a month ago. 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 Current symptoms include:  pain and swelling R foot . Aggravating factors: any weight bearing. Symptoms have progressed to a point and plateaued. Patient has had no prior foot problems. Evaluation to date: plain films: abnormal fracture base of R 5th metatrsal, non displaced, non angulated/nondeviated . Treatment to date:  went to ER where they obtained xrays, provided a short CAM boot and crutches. States she has been off the foot for the past 3-4 weeks . Patients rates pain 6/10 on pain scale.    10/07/2022: follow up for R foot 5th met base fracture. Has been keeping off foot with wheelchair and crutches. No pain. Had xray done which showed healing fracture of 5th met base.  No other complaints at this time.     10/27/2022: follow up for R 5th met base fracture. Using CAM boot and crutches as directed and has 0/10 pain. Doing well. Here for further recommendations.     11/23/2022: follow up for R 5th met base fracture. Doing well. Using Darco shoe as directed. Hurts only on occasion but most of the time she is pain free. Still swelling a little. No new concerns.     12/27/2022: follow up for right 5th met base fracture. Pain improved in area of fracture but having some pain at the level of ankle behind the fracture. Would like to get assessed for recommendations.     03/23/2023: follow up for R 5th met base fracture. States since she got out of the darco shoe and started working again, her foot started to hurt and become swollen and red again in the area of her fracture. Still  "hurts at about 8/10 and is inquiring about additional assessment and recommendations. Has been problematic now for 7 months. Most recent xray from 3/3/23 shows faint fracture line with possible non union/delayed union.     03/29/2023: Follow up for R foot pain and injury. Due to continued pain, patient had MRI done which shows incompletely healed fracture of 5th met base and CC joint arthritis as well as tenosynovitis of PB tendon. She is here for further recommendations.      05/09/2023: follow up for R foot pain and injury. Previous steroid injection did not help at all with the amount of pain but now it does take longer being on her feet for pain to start. Overall walking but not able to do certain movements like squatting or extending walking/standing without pain. Pain is still 6/10. Has had 2 sessions of PT which have helped a little.     06/27/2023: follow up for R foot pain and injury. Area of previous fracture doing better but having pain and small lump along the outside of the ankle. Pain is 4/10 overall and she is wearing Ankle Brace with darco shoe. Here for further recommendations. Was not able to get bone stimulator yet.     09/05/2023: follow up for R foot pain. Had MRI done which showed healed fracture of 5th met base but also showed residual CC joint arthritis. States overall her pain is almost gone. She is able to go back to work and preform all duties without pain or problems. Overall doing better.      Vitals:    09/05/23 1058   BP: 134/77   Pulse: 73   Weight: 88.3 kg (194 lb 10.7 oz)   Height: 5' 5" (1.651 m)   PainSc:   4   PainLoc: Foot     Review of Systems   Constitutional: Negative for chills and fever.   Cardiovascular:  Negative for chest pain, claudication and leg swelling.   Respiratory:  Negative for cough and shortness of breath.    Skin:  Negative for dry skin and nail changes.   Musculoskeletal:         R foot pain improved    Gastrointestinal:  Negative for nausea and vomiting. "   Neurological:  Negative for numbness and paresthesias.   Psychiatric/Behavioral:  Negative for altered mental status.            Objective:      Physical Exam  Vitals reviewed.   Constitutional:       Appearance: She is well-developed.   HENT:      Head: Normocephalic.   Cardiovascular:      Pulses:           Dorsalis pedis pulses are 2+ on the right side and 2+ on the left side.        Posterior tibial pulses are 2+ on the right side and 2+ on the left side.      Comments: CRT < 3 sec to tips of toes.   Pulmonary:      Effort: No respiratory distress.   Musculoskeletal:      Right lower leg: Edema (foot, improved) present.      Left lower leg: No edema.      Comments: No further pain with palpation and ROM of 5th metatarsal R foot. No further pain with ROM of 5th ray R foot. Small 1-2 cm palpable bony hypertrophy along R lateral anterior ankle/hindfoot. No further pain with palpation of peroneus tertius and peroneus brevis tendons along distal lateral R ankle and onto its insertion on the 5th metatarsal shaft. No reproducible pain with palpation and ROM of R hindfoot joints. Otherwise rectus foot and toe position bilateral with no major deformities noted. Mild equinus noted b/l ankles with < 10 deg DF noted. MMT 5/5 in DF/PF/Inv/Ev resistance with no reproduction of pain in any direction. Passive range of motion of ankle and pedal joints is painless b/l.     Skin:     General: Skin is warm and dry.      Findings: No erythema.      Comments: No open lesions, lacerations or wounds noted. Nails are normal to R 1-5 and L 1-5. Interdigital spaces clean, dry and intact b/l. No erythema noted to b/l foot. Skin texture normal. Pedal hair normal. No ecchymosis or bruising noted to R foot. Skin temperature normal b/l foot.      Neurological:      Mental Status: She is alert and oriented to person, place, and time.      Sensory: No sensory deficit.      Comments: Light touch, proprioception, and sharp/dull sensation are all  intact bilaterally.    Psychiatric:         Behavior: Behavior normal.         Thought Content: Thought content normal.         Judgment: Judgment normal.            Assessment:       Encounter Diagnosis   Name Primary?    Osteoarthritis of calcaneocuboid joint Yes          Plan:        was seen today for follow-up and foot pain.    Diagnoses and all orders for this visit:    Osteoarthritis of calcaneocuboid joint       I counseled the patient on her conditions, their implications and medical management.    Pain resolved. Resume full work activities and physical activities to tolerance.     Advised that CC joint arthritis may flare from time to time. Monitor.     Long discussion with patient regarding appropriate, supportive and comfortable shoes. Recommended supportive style shoe brands with adequate arch supports to alleviate abnormal pressure and improve stability of foot while walking. Avoid flat shoes and barefoot walking as these will exacerbate or worsen symptoms.     RTC PRN

## 2023-09-05 NOTE — LETTER
September 5, 2023      [facility]  [address]  [facility] [address]  Phone: [phone]       Patient: Blanca Thomas   YOB: 1970  Date of Visit: 09/05/2023    To Whom It May Concern:    Evon Thomas  was at [facility] on 09/05/2023. At this time, the patient may return to work with NO restrictions however her chronic foot condition may require her to follow up periodically in the Podiatry clinic for flare ups and general follow up. If you have any questions or concerns, or if I can be of further assistance, please do not hesitate to contact me.    Sincerely,    Jon Shaikh DPM